# Patient Record
Sex: FEMALE | Race: WHITE | NOT HISPANIC OR LATINO | Employment: FULL TIME | URBAN - METROPOLITAN AREA
[De-identification: names, ages, dates, MRNs, and addresses within clinical notes are randomized per-mention and may not be internally consistent; named-entity substitution may affect disease eponyms.]

---
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
## 2021-04-24 ENCOUNTER — HEALTH MAINTENANCE LETTER (OUTPATIENT)
Age: 21
End: 2021-04-24

## 2021-05-27 ENCOUNTER — RECORDS - HEALTHEAST (OUTPATIENT)
Dept: ADMINISTRATIVE | Facility: CLINIC | Age: 21
End: 2021-05-27

## 2021-10-03 ENCOUNTER — HEALTH MAINTENANCE LETTER (OUTPATIENT)
Age: 21
End: 2021-10-03

## 2021-10-29 ENCOUNTER — MEDICAL CORRESPONDENCE (OUTPATIENT)
Dept: HEALTH INFORMATION MANAGEMENT | Facility: CLINIC | Age: 21
End: 2021-10-29

## 2021-11-11 ENCOUNTER — TRANSCRIBE ORDERS (OUTPATIENT)
Dept: OTHER | Age: 21
End: 2021-11-11

## 2021-11-11 DIAGNOSIS — F41.1 GENERALIZED ANXIETY DISORDER: ICD-10-CM

## 2021-11-11 DIAGNOSIS — F33.1 MAJOR DEPRESSIVE DISORDER, RECURRENT EPISODE, MODERATE (H): ICD-10-CM

## 2021-11-11 DIAGNOSIS — S06.0XAA CONCUSSION: ICD-10-CM

## 2021-11-29 ENCOUNTER — TELEPHONE (OUTPATIENT)
Dept: PHYSICAL MEDICINE AND REHAB | Facility: CLINIC | Age: 21
End: 2021-11-29

## 2021-11-29 NOTE — TELEPHONE ENCOUNTER
appointment arranged for concussion clinic per referral from [facility].Patient informed.

## 2022-05-15 ENCOUNTER — HEALTH MAINTENANCE LETTER (OUTPATIENT)
Age: 22
End: 2022-05-15

## 2022-09-10 ENCOUNTER — HEALTH MAINTENANCE LETTER (OUTPATIENT)
Age: 22
End: 2022-09-10

## 2022-10-11 PROCEDURE — 99283 EMERGENCY DEPT VISIT LOW MDM: CPT

## 2022-10-12 ENCOUNTER — HOSPITAL ENCOUNTER (EMERGENCY)
Facility: CLINIC | Age: 22
Discharge: HOME | End: 2022-10-12
Attending: EMERGENCY MEDICINE | Admitting: EMERGENCY MEDICINE
Payer: COMMERCIAL

## 2022-10-12 VITALS
HEIGHT: 69 IN | HEART RATE: 82 BPM | OXYGEN SATURATION: 99 % | SYSTOLIC BLOOD PRESSURE: 128 MMHG | DIASTOLIC BLOOD PRESSURE: 74 MMHG | TEMPERATURE: 98.9 F | RESPIRATION RATE: 16 BRPM

## 2022-10-12 DIAGNOSIS — S06.0X0A CONCUSSION WITHOUT LOSS OF CONSCIOUSNESS, INITIAL ENCOUNTER: ICD-10-CM

## 2022-10-12 DIAGNOSIS — F44.5 PSYCHOGENIC NONEPILEPTIC SEIZURE: ICD-10-CM

## 2022-10-12 DIAGNOSIS — F07.81 POST CONCUSSIVE SYNDROME: ICD-10-CM

## 2022-10-12 ASSESSMENT — ENCOUNTER SYMPTOMS
WEAKNESS: 1
BACK PAIN: 0
DIZZINESS: 1
NECK PAIN: 0
ABDOMINAL PAIN: 0
NUMBNESS: 1

## 2022-10-12 ASSESSMENT — ACTIVITIES OF DAILY LIVING (ADL): ADLS_ACUITY_SCORE: 35

## 2022-10-12 NOTE — ED PROVIDER NOTES
History   Chief Complaint:  Seizures     HPI   Katie Hurtado is a 22 year old female with history of concussions and anxiety induced seizures who presents with seizures. Five months ago, the patient was kicked in the head and chest by a horse and was diagnosed with a concussion following blood work and imaging.She has had two diagnosed concussions, and she believes she has had several undiagnosed concussions. Several days ago she was hit in the head. Since this event she has had an increased number of headaches and has felt low on her feet. Yesterday, she had several episodes of dry heaving and dizziness. Subsequently, she had a 30 minute episode of paralysis in which she did not lose consciousness. Additionally, she notes numbness and tingling in both of her arms. She denies any double or blurry vision, new neck or back pain or abdominal pain. Secondary to these symptoms she called the nursing line and was advised to present here. Of note, she does have a history of anxiety induced seizures and has seen neurology for this. She does see a therapist and psychiatrist. She occasionally uses alcohol but does not use tobacco or illegal drugs. She is not on any blood thinners.     Review of Systems   Eyes: Negative for visual disturbance.   Gastrointestinal: Negative for abdominal pain.   Musculoskeletal: Negative for back pain and neck pain.   Neurological: Positive for dizziness, weakness (bilateral upper extremity) and numbness.   All other systems reviewed and are negative.    Allergies:  Grass  Neomycin  Nickel  Lorazepam     Medications:  Vitamin D3  Prozac  Vitamin B    Past Medical History:    GERD  Factor V Leiden mutation  Seizure-like activity  Self-injurious behavior  Suicidal ideation  Borderline personality disorder  Autism spectrum disorder  Depression  Mononucleosis  Concussion  Hypermobility syndrome    Past Surgical History:    Arthroscopy knee with patellar realignment, left  Arthroscopy knee  "with retinacular release, left     Family History:    Brother: Alcoholism  Father: Anxiety, DVT, Depression  Mother: Factor V Leiden deficiency     Social History:  The patient was accompanied to the ED by her sister.  The patient occassionally uses alcohol.  The patient does not use tobacco or illegal substances.     Physical Exam     Patient Vitals for the past 24 hrs:   BP Temp Temp src Pulse Resp SpO2 Height   10/12/22 0253 128/74 -- -- 82 16 99 % --   10/11/22 2254 (!) 141/77 98.9  F (37.2  C) Temporal 85 16 97 % 1.753 m (5' 9\")       Physical Exam  Constitutional: Well developed, nontox appearance  Head: Atraumatic.   Neck:  no stridor, no meningismus  Eyes: no scleral icterus, PERRL, EOMI  Cardiovascular: RRR, 2+ bilat radial pulses  Pulmonary/Chest: nml resp effort  Ext: Warm, well perfused, no edema  Neurological: A&O,  CNII-XII intact, nml finger to nose, 5/5 strength throughout upper and lower ext, symmetric; sensation grossly intact  Skin: Skin is warm and dry.   Psychiatric: Behavior is normal. Thought content normal.   Nursing note and vitals reviewed.      Emergency Department Course   Emergency Department Course:     Reviewed:  I reviewed nursing notes, vitals, past medical history and care everywhere    Assessments:  0258 I obtained history and examined the patient as noted above. We discussed the plan for care.    Disposition:  The patient was discharged to home.     Impression & Plan   Medical Decision Makin year old female presenting w/ a head injury, episode of \"paralysis\"    This patient has a history and clinical exam consistent with concussion and likely postconcussive syndrome given her persistent/recurrent symptoms since her last concussion in .  Reported episode of paralysis seems to be functional in nature and likely consistent with her history of nonepileptiform psychogenic seizures.  Doubt skull fracture, epidural hematoma, subdural hematoma, intracerebral hemorrhage, and " traumatic subarachnoid hemorrhage; all of these are highly unlikely in this clinical setting. This patient denies severe headache, seizure, and has no focal neurological findings. The patient did not have prolonged LOC, repeated emesis, or poor orientation. By the [place] head CT rules, the patient does not appear to warrant head CT imaging at this time.  Concussion precautions given for home.  Concussion  referral placed prior to discharge.  At this time I feel the patient is safe for discharge.  Recommendations given regarding follow up with PCP and return to the emergency department as needed for new or worsening symptoms.  Counseled on all results, diagnosis and disposition.  Pt understanding and agreeable to plan. Patient discharged in stable condition.        Diagnosis:    ICD-10-CM    1. Concussion without loss of consciousness, initial encounter  S06.0X0A Concussion  Referral      2. Post concussive syndrome  F07.81 Concussion  Referral      3. Psychogenic nonepileptic seizure  F44.5 Concussion  Referral        Scribe Disclosure:  I, Yoandy Palumbo, am serving as a scribe at 1:56 AM on 10/12/2022 to document services personally performed by Joseluis Fry MD based on my observations and the provider's statements to me.      Joseluis Fry MD  10/12/22 0354

## 2022-10-12 NOTE — ED TRIAGE NOTES
"Patient stated that she had a 30 minute \"paralysis episode, did not lose consciousness.  Recent concussion.      Triage Assessment     Row Name 10/11/22 2729       Triage Assessment (Adult)    Airway WDL WDL       Respiratory WDL    Respiratory WDL WDL       Skin Circulation/Temperature WDL    Skin Circulation/Temperature WDL WDL       Cardiac WDL    Cardiac WDL WDL       Peripheral/Neurovascular WDL    Peripheral Neurovascular WDL WDL       Cognitive/Neuro/Behavioral WDL    Cognitive/Neuro/Behavioral WDL WDL              "

## 2022-10-12 NOTE — DISCHARGE INSTRUCTIONS
1.  -Take acetaminophen 500 to 1000 mg by mouth every 4 to 6 hours as needed for pain or fever.  Do not take more than 4000 mg in 24 hours.  Do not take within 6 hours of another acetaminophen containing medication such as norco (vicodin) or percocet.  - Take ibuprofen 600 to 800 mg by mouth every 6 to 8 hours as needed for pain or fever  2. Stay hydrated at home.  3. Please stay in low stimulus, dim environments for the next 48 hours.  4. You may return to exercising or strenuous physical activity 7 days after your symptoms resolve.  5. Please cease activity that worsens your symptoms.  6. Please follow-up with your primary care doctor as needed.  If symptoms persist for weeks, you may discuss referral to a neurologist with your primary doctor.  7. You may return to the ED as needed for new or worsening symptoms such as vomiting and unable to keep anything down, severe confusion and inability to function at home, severe and uncontrollable pain, focal weakness.      Discharge Instructions  Concussion    You were seen today for signs of a concussion.  The symptoms will vary, depending on the nature of your injury and your health. You may have: headache, confusion, nausea (feel sick to your stomach), vomiting (throwing up) and problems with memory, concentrating, or sleep. You may feel dizzy, irritable, and tired. Children and teens may need help from their parents, teachers, and coaches to watch for symptoms as they recover.    Generally, every Emergency Department visit should have a follow-up clinic visit with either a primary or a specialty clinic/provider. Please follow-up as instructed by your emergency provider today.     Return to the Emergency Department if:  Your headache gets worse or you start to have a really bad headache even with the recommended treatment plan.   You feel drowsier, have growing confusion, or slurred speech.   You keep repeating yourself.   You have strange behavior or are feeling more  irritable.   You have a seizure.   You vomit (throw up) more than once.   You have trouble walking.   You have weakness or numbness.  Your neck pain gets worse.   You have a loss of consciousness.   You have blood for fluid coming from your ears or nose.   You have new symptoms or anything that worries you.     Home Care:  Get lots of rest and get enough sleep at night. Take daytime naps or rest if you feel tired.   Limit physical activity and  thinking  activities. These can make symptoms worse.   Physical activities include gym, sports, weight training, running, exercise, and heavy lifting.   Thinking activities include homework, class work, job-related work, and screen time (phone, computer, tablet, TV, and video games).   Stick to a healthy diet and drink lots of fluids. Avoid alcohol.  As symptoms improve, you may slowly return to your daily activities. If symptoms get worse or return, reduce your activity.   Know that it is normal to feel sad or frustrated when you do not feel right and are less active.     Going Back to Work:  Your care team will tell you when you are ready to return to work.    Limit the amount of work you do soon after your injury. This may speed healing. Take breaks if your symptoms get worse. You should also reduce your physical activity as well as activities that require a lot of thinking until you see your doctor. You may need shorter work days and a lighter workload.  Avoid heavy lifting, working with machinery, driving and working at heights until your symptoms are gone or you are cleared by a provider.    Going Back to School:  If you are still having symptoms, you may need extra help at school.  Tell your teachers and school nurse about your injury and symptoms. Ask them to watch for problems with learning, memory, and concentrating. Symptoms may get worse when you do schoolwork, and you may become more irritable. You may need shorter school days, a reduced workload, and to postpone  testing.  Do not drive or take gym class (physical activity) until cleared by a provider.    Returning to Sports:  Never return to play if you have any symptoms. A full recovery will reduce the chances of getting hurt again. Remember, it is better to miss one or two games than a whole season.  You should rest from all physical activity until you see your provider. Generally, if all symptoms have completely cleared, your provider can help guide you to slowly return to sports. If symptoms return or worsen, stop the activity and see your provider.  Important: If you are in an organized sport and under age 18, you will need written consent from a healthcare provider before you return to sports. Typically, this will be your primary care or sports medicine provider. Please make an appointment.    If you were given a prescription for medicine here today, be sure to read all of the information (including the package insert) that comes with your prescription.  This will include important information about the medicine, its side effects, and any warnings that you need to know about.  The pharmacist who fills the prescription can provide more information and answer questions you may have about the medicine.  If you have questions or concerns that the pharmacist cannot address, please call or return to the Emergency Department.     Remember that you can always come back to the Emergency Department if you are not able to see your regular provider in the amount of time listed above, if you get any new symptoms, or if there is anything that worries you.

## 2022-10-13 ENCOUNTER — NURSE TRIAGE (OUTPATIENT)
Dept: NURSING | Facility: CLINIC | Age: 22
End: 2022-10-13

## 2022-10-13 NOTE — TELEPHONE ENCOUNTER
"Patient is calling about worsening headache and new neurologic symptoms since being evaluated yesterday in ED for concussion.    She was in ED yesterday - discharged at 0330  Diagnosed with concussion  She states her headache is not getting better; she rates pain 7-8/10, pain varies in intensity but she feels it is overall worse than yesterday. She has been taking Tyleno and ibuprofen as ordered. She also has numbness/cold tingling feeling in limbs ; she had this symptom in her arms yesterday, but today she has new numbness/tingling in the legs. She is also concerned because the ED recommended she take 2 days off of work, but she has a strenuous physical job at a farm and not sure if she can safely return in 2 days.    Reviewed AVS with patient, including:    \"3. Please stay in low stimulus, dim environments for the next 48 hours.  4. You may return to exercising or strenuous physical activity 7 days  after your symptoms resolve.  ...  You may return to the ED as needed for new or worsening symptoms such as vomiting and unable to keep anything down, severe confusion and inability to function at home, severe and uncontrollable pain, focal Weakness.  ...  Your headache gets worse or you start to have a really bad headache even with the recommended treatment plan.  ...  You have weakness or numbness.  ...  You have new symptoms or anything that worries you.\"    Writer recommended patient return to ED for worsening headache and new numbness in legs. She states her boyfriend will be there shortly to bring her to [facility] in [place]. Writer recommended patient go to ED, as her [facility] might not see head injury or follow up care; she plans to go to this clinic first to check.    Ofelia Ward RN  [place] Nurse Advisor  8:48 AM  10/13/2022      Reason for Disposition    SEVERE headache (e.g., excruciating, pain scale 8-10) and not improved after pain medications    Additional Information    Negative: Weakness (i.e., " paralysis, loss of muscle strength) of the face, arm or leg on one side of the body    Negative: Loss of speech or garbled speech    Negative: Difficult to awaken or acting confused (e.g., disoriented, slurred speech)    Negative: Sounds like a life-threatening emergency to the triager    Negative: Concussion suspected and has not been examined by a doctor (or NP/PA)    Negative: Mild traumatic brain injury (mTBI; concussion) and more than 14 days since head injury    Negative: Concussion symptoms are worsening    Negative: Knocked out (unconscious) > 1 minute and no head CT Scan or MRI has been performed    Negative: Vomiting once or more and no head CT Scan or MRI has been performed    Negative: Unsteady on feet (e.g., unable to stand or requires support to walk) and no head CT Scan or MRI has been performed    Negative: Neck pain after dangerous injury (e.g., MVA, diving, trampoline, contact sports, fall > 10 feet or 3 meters) and no neck xray has been performed (e.g., c-spine xray or CT)    Negative: Patient sounds very sick or weak to the triager    Protocols used: CONCUSSION (MTBI) LESS THAN 14 DAYS AGO FOLLOW-UP CALL-A-[place]

## 2022-10-19 ENCOUNTER — VIRTUAL VISIT (OUTPATIENT)
Dept: NEUROLOGY | Facility: CLINIC | Age: 22
End: 2022-10-19
Payer: COMMERCIAL

## 2022-10-19 DIAGNOSIS — F07.81 POST CONCUSSIVE SYNDROME: Primary | ICD-10-CM

## 2022-10-19 DIAGNOSIS — F44.5 PSYCHOGENIC NONEPILEPTIC SEIZURE: ICD-10-CM

## 2022-10-19 DIAGNOSIS — S06.0X0A CONCUSSION WITHOUT LOSS OF CONSCIOUSNESS, INITIAL ENCOUNTER: ICD-10-CM

## 2022-10-19 PROCEDURE — 99205 OFFICE O/P NEW HI 60 MIN: CPT | Mod: GT | Performed by: NURSE PRACTITIONER

## 2022-10-19 RX ORDER — MIRTAZAPINE 15 MG/1
15 TABLET, FILM COATED ORAL AT BEDTIME
COMMUNITY
Start: 2022-01-31

## 2022-10-19 RX ORDER — SERTRALINE HYDROCHLORIDE 100 MG/1
100 TABLET, FILM COATED ORAL DAILY
COMMUNITY
Start: 2022-10-03

## 2022-10-19 RX ORDER — HYDROXYZINE PAMOATE 25 MG/1
CAPSULE ORAL
COMMUNITY
Start: 2022-10-02

## 2022-10-19 NOTE — PROGRESS NOTES
CONCUSSION SYMPTOMS ASSESSMENT 10/19/2022   Headache or Pressure In Head 4 - moderate to severe   Upset Stomach or Throwing Up 2 - mild to moderate   Problems with Balance 5 - severe   Feeling Dizzy 4 - moderate to severe   Sensitivity to Light 0 - none   Sensitivity to Noise 1 - mild   Mood Changes 1 - mild   Feeling sluggish, hazy, or foggy 5 - severe   Trouble Concentrating, Lack of Focus 1 - mild   Motion Sickness 0 - none   Vision Changes 0 - none   Memory Problems 2 - mild to moderate   Feeling Confused 1 - mild   Neck Pain 3 - moderate   Trouble Sleeping 1 - mild   Total Number of Symptoms 12   Symptom Severity Score 30
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
## 2022-12-28 ENCOUNTER — VIRTUAL VISIT (OUTPATIENT)
Dept: NEUROLOGY | Facility: CLINIC | Age: 22
End: 2022-12-28
Payer: COMMERCIAL

## 2022-12-28 DIAGNOSIS — F07.81 POST CONCUSSION SYNDROME: Primary | ICD-10-CM

## 2022-12-28 PROCEDURE — 99213 OFFICE O/P EST LOW 20 MIN: CPT | Mod: GT | Performed by: NURSE PRACTITIONER

## 2022-12-28 NOTE — PROGRESS NOTES
"Visit start time-0816  Video Visit: Concussion Follow up:   Katie Hurtado is a 22 year old female who is being evaluated via a billable video visit       The patient has been notified of the following:     \"This video visit will be conducted via a video call between you and your provider. We have found that certain health care needs can be provided without the need for a physical exam.  This service lets us provide the care you need with a short video conversation.  If a prescription is necessary we can send it directly to your pharmacy.  If lab work is needed we can place an order for that and you can then stop by our lab to have the test done at a later time.    If during the course of the call the provider feels a video visit is not appropriate, you will not be charged for this service.\"     Patient has given verbal consent to a video visit? Yes    Visit Check In:   Orders from previous visit:   Order PT for dizziness, numbness, neck and head pain, OT for sound sensitivity and anxiety reducing techniques, ST for cognition, no working.  Neuropsychological assessment completed    No   Currently doing PT  No    Completed Yes   Currently doing OT  No    Completed Yes   Currently doing ST   No    Completed No   Psychology  Yes   Return to Work/School - patient is currently looking for a job    Currently on medication to help you sleep   Yes  Mirtazapine and Hydroxyzine  Currently on medication to help with mental health Yes     Hydroxyzine and Sertraline  for   MMD. ROLAN, Autism  Currently on medication for concentration or ADD /ADHD      No                                                        Workman's Comp   No     Outpatient Follow up Mild TBI (Concussion)  Evaluation:   Katie Hurtado chief complaint is Post Concussion Syndrome     Is patient on a controlled substance prescribed by me?  No     HPI:      Pertinent History:   Per ED note on 10/12/22...Katie Hurtado is a 22 year old female with history " of concussions and anxiety induced seizures who presents with seizures. Five months ago, the patient was kicked in the head and chest by a horse and was diagnosed with a concussion following blood work and imaging.She has had two diagnosed concussions, and she believes she has had several undiagnosed concussions. Several days ago she was hit in the head. Since this event she has had an increased number of headaches and has felt low on her feet. Yesterday, she had several episodes of dry heaving and dizziness. Subsequently, she had a 30 minute episode of paralysis in which she did not lose consciousness. Additionally, she notes numbness and tingling in both of her arms. She denies any double or blurry vision, new neck or back pain or abdominal pain. Secondary to these symptoms she called the nursing line and was advised to present here. Of note, she does have a history of anxiety induced seizures and has seen neurology for this. She does see a therapist and psychiatrist. She occasionally uses alcohol but does not use tobacco or illegal drugs. She is not on any blood thinners.     Date of accident :  10/11/22    Plan:        We discussed some treatment options and have elected to  Referral to PT for cranial sacral therapy to help with neck pain, patient will monitor symptoms once she starts a new job     Medication Adjustment:  No medication changes    Return to Work/School -patient is not working at this time    Return to clinic 3 months    Continue with the support of the clinic, reassurance, and redirection. Staff monitoring and ongoing assessments per team plan. This team will utilize appropriate emergency services if necessary. I will make myself available if concerns or problems arise.  The patient agrees to call/message before her next visit with any questions, concerns or problems.    Progress Note:        The patient returns to the concussion clinic for a follow up visit, She was last seen by me on 10/19/22,  no medication changes were made at that appointment. Patient reports that she is doing much better. She did not receive a call to set up therapies but reports not needing much any more. She reports that her headaches have reduced in severity and frequency, she states that she is having about 1-2 headaches  She is experiencing neck pain. She is currently looking for employment. Overall the patient is reporting improvement in her physical, emotional and cognitive symptoms     Subjective:        Overall improvement from last visit   Yes     Headaches:  Significant ongoing headaches Yes   Headaches: Intermittently  Improvement :Yes   Current Headache No   Wake with HA  Yes     Physical Symptoms:  Headache-Yes     Since last visit  Improved     Nausea-No               Balance problems - No       Dizziness - No           Visual problems - No       Fatigue - Yes              Since last visit  Improved     Sensitivity to light - No         Sensitivity to sound - No         Numbness/tingling - No            Cognitive Symptoms  Feeling mentally foggy -Yes        Since last visit  Improved     Feeling confused -No          Difficulty Concentrating- No         Difficulty remembering - No              Emotional Symptoms  Irritability - No            Sadness-  No          More emotional - No           Nervousness/anxiety -No            Sleep History:  Sleep less than usual - No    Sleep more than usual - No    Trouble falling asleep - No        Trouble staying asleep - Yes       Since last visit  Improved     Wake feeling rested - most of the time        Since last visit  Improved        Exertion:         Do the above stated symptoms worsen with physical activity? No                   Do the above stated symptoms worsen with cognitive activity? No                Objective:   There are no problems to display for this patient.    Past Medical History:   Diagnosis Date     Heartburn      Past Surgical History:   Procedure Laterality  Date     ARTHROSCOPY KNEE WITH PATELLAR REALIGNMENT Left 12/18/2015    Procedure: ARTHROSCOPY KNEE WITH PATELLAR REALIGNMENT;  Surgeon: Jennifer Funez MD;  [facility]     ARTHROSCOPY KNEE WITH RETINACULAR RELEASE Left 12/18/2015    Procedure: ARTHROSCOPY KNEE WITH RETINACULAR RELEASE MEDIAL OR LATERAL;  Surgeon: Jennifer Funez MD;  Location: [facility]     No family history on file.  Current Outpatient Medications   Medication Sig Dispense Refill     acetaminophen (TYLENOL) 325 MG tablet Take 2 tablets (650 mg) by mouth every 4 hours as needed for other (mild pain) 100 tablet 0     Calcium Carbonate Antacid (TUMS PO) Take 1 tablet by mouth as needed       cetirizine (ZYRTEC) 10 MG tablet Take 10 mg by mouth as needed        Cholecalciferol (VITAMIN D3 PO) Take 400 Units by mouth daily       Elastic Bandages & Supports (KNEE BRACE) [facility] Patient has Left patellar instability.    Dr. Funez has ordered a Don Judy Reaction brace or Q payton Brace. 1 each 0     hydrOXYzine (VISTARIL) 25 MG capsule TAKE ONE TO TWO CAPSULES BY MOUTH AT BEDTIME AS NEEDED       Ibuprofen (ADVIL PO) Take 1-2 tablets by mouth as needed for moderate pain       mirtazapine (REMERON) 15 MG tablet Take 15 mg by mouth At Bedtime       sertraline (ZOLOFT) 100 MG tablet Take 100 mg by mouth daily       sertraline (ZOLOFT) 50 MG tablet TAKE ONE TABLET BY MOUTH EVERY MORNING WITH 100 MG TABLET FOR TOTAL DOSE  MG       vitamin B complex with vitamin C (STRESS TAB) tablet Take 1 tablet by mouth daily       Social History     Socioeconomic History     Marital status: Single     Spouse name: Not on file     Number of children: Not on file     Years of education: Not on file     Highest education level: Not on file   Occupational History     Not on file   Tobacco Use     Smoking status: Never     Smokeless tobacco: Never   Substance and Sexual Activity     Alcohol use: No     Drug use: No     Sexual activity: Not on file   Other Topics Concern      Not on file   Social History Narrative     Not on file     Social Determinants of Health     Financial Resource Strain: Not on file   Food Insecurity: Not on file   Transportation Needs: Not on file   Physical Activity: Not on file   Stress: Not on file   Social Connections: Not on file   Intimate Partner Violence: Not on file   Housing Stability: Not on file       ALLERGIES  Grass, Neomycin, and Nickel    The following portions of the patient's history were reviewed and updated as appropriate: allergies, current medications, past family history, past medical history, past social history, past surgical history and problem list.    Review of Systems  A comprehensive review of systems was negative except for: What is noted above    Mental Status Examination  Alertness:  alert  and oriented  Appearance:  casually groomed  Behavior/Demeanor:  cooperative, pleasant and calm, with good  eye contact.  Speech:  normal  Psychomotor:  normal or unremarkable    Mood:  good  Affect:  appropriate and was congruent to speech content.  Thought Process/Associations: unremarkable   Thought Content: devoid of  suicidal and violent ideation, delusions [details in Interim History] and delusions.   Perception: devoid of  auditory hallucinations and visual hallucinations  Insight:  good.  Judgment: good.  Attention/Concentration:  Normal  Language:  Intact  Fund of Knowledge:  Average.    Memory:  Immediate recall intact, Short-term memory intact and Long-term memory intact.       Counseling:   Discussion was held with the patient today regarding concussion in general including types of injury, symptoms that are common, treatment and variability in time to recover  I have reassured the patient her symptoms are very common when a concussion is present and will improve with time. We discussed the risks and benefits of possible medication used to help concussive symptoms including risk of worsening depression with medication adjustments  and even the possibility of emergence of suicidal ideations. We will assess for the appropriateness of possible psychotropic medication trials/changes. The patient will seek out appropriate emergency services should that become necessary. The patient agrees to call/message before her next visit with any questions, concerns or problems.    Visit Details:   Type of service: Video Visit    Video Start Time: 0816    Video End Time:  0829    Originating Location: Patient's home    Distant Location:  [facility]    Mode of Communication: Video Conference via  [employer] (My Chart)     Diagnosis managed and treated at today's visit :  Post concussion syndrome  Post concussion headache  Nausea  Dizziness  Fatigue  Insomnia  Sensitivity to light  Sound sensitivity  Concentration and Attention deficit  Memory difficulties  Anxiety d/t a medical condition  Irritability  Return to work    Total time today (20 min) in this patient encounter was spent on pre-charting, chart review, review of outside records, review of test results, interpretation of tests, patient visit and documentation and counseling and/or coordination of care. The patient is in agreement with this plan and has no further questions.    General Information:   Today you had your appointment with Mesha Ellis CNP     If lab work was done today as part of your evaluation you will generally be contacted via My Chart, mail, or phone with the results within 1-5 days. If there is an alarming result we will contact you by phone. Lab results come back at varying times, I generally wait until all labs are resulted before making comments on results. Please note labs are automatically released to My Chart once available.     If you need refills please contact your pharmacist. They will send a refill request to me to review. If it is a controlled substance please message me through [employer]. Please allow 3 business days for us to  process all refill requests.     Please call or send a medical message through My Chart, with any questions or concerns    If you need any paperwork completed please fax forms to [phone]. Please state if you would like a copy of the completed paperwork, mailed or faxed back to the patient and a fax number to fax the paperwork to. Please allow up to 10 business days for paperwork to be completed.    TAMRA Prince, CNP      [facility]  [facility] [address]  [address] [place] [id number]  Office: [phone]  Fax: [phone]

## 2022-12-28 NOTE — LETTER
"    12/28/2022         RE: Katie Hurtado  [address] [address]  [facility] [address]        Dear Colleague,    Thank you for referring your patient, Katie Hurtado, to the [facility]. Please see a copy of my visit note below.    Visit start time-0816  Video Visit: Concussion Follow up:   Katie Hurtado is a 22 year old female who is being evaluated via a billable video visit       The patient has been notified of the following:     \"This video visit will be conducted via a video call between you and your provider. We have found that certain health care needs can be provided without the need for a physical exam.  This service lets us provide the care you need with a short video conversation.  If a prescription is necessary we can send it directly to your pharmacy.  If lab work is needed we can place an order for that and you can then stop by our lab to have the test done at a later time.    If during the course of the call the provider feels a video visit is not appropriate, you will not be charged for this service.\"     Patient has given verbal consent to a video visit? Yes    Visit Check In:   Orders from previous visit:   Order PT for dizziness, numbness, neck and head pain, OT for sound sensitivity and anxiety reducing techniques, ST for cognition, no working.  Neuropsychological assessment completed    No   Currently doing PT  No    Completed Yes   Currently doing OT  No    Completed Yes   Currently doing ST   No    Completed No   Psychology  Yes   Return to Work/School - patient is currently looking for a job    Currently on medication to help you sleep   Yes  Mirtazapine and Hydroxyzine  Currently on medication to help with mental health Yes     Hydroxyzine and Sertraline  for   MMD. ROLAN, Autism  Currently on medication for concentration or ADD /ADHD      No                                                        Workman's Comp   No     Outpatient Follow up Mild TBI " (Concussion)  Evaluation:   Katie Hurtado chief complaint is Post Concussion Syndrome     Is patient on a controlled substance prescribed by me?  No     HPI:      Pertinent History:   Per ED note on 10/12/22...Katie Hurtado is a 22 year old female with history of concussions and anxiety induced seizures who presents with seizures. Five months ago, the patient was kicked in the head and chest by a horse and was diagnosed with a concussion following blood work and imaging.She has had two diagnosed concussions, and she believes she has had several undiagnosed concussions. Several days ago she was hit in the head. Since this event she has had an increased number of headaches and has felt low on her feet. Yesterday, she had several episodes of dry heaving and dizziness. Subsequently, she had a 30 minute episode of paralysis in which she did not lose consciousness. Additionally, she notes numbness and tingling in both of her arms. She denies any double or blurry vision, new neck or back pain or abdominal pain. Secondary to these symptoms she called the nursing line and was advised to present here. Of note, she does have a history of anxiety induced seizures and has seen neurology for this. She does see a therapist and psychiatrist. She occasionally uses alcohol but does not use tobacco or illegal drugs. She is not on any blood thinners.     Date of accident :  10/11/22    Plan:        We discussed some treatment options and have elected to  Referral to PT for cranial sacral therapy to help with neck pain, patient will monitor symptoms once she starts a new job     Medication Adjustment:  No medication changes    Return to Work/School -patient is not working at this time    Return to clinic 3 months    Continue with the support of the clinic, reassurance, and redirection. Staff monitoring and ongoing assessments per team plan. This team will utilize appropriate emergency services if necessary. I will make myself  available if concerns or problems arise.  The patient agrees to call/message before her next visit with any questions, concerns or problems.    Progress Note:        The patient returns to the concussion clinic for a follow up visit, She was last seen by me on 10/19/22, no medication changes were made at that appointment. Patient reports that she is doing much better. She did not receive a call to set up therapies but reports not needing much any more. She reports that her headaches have reduced in severity and frequency, she states that she is having about 1-2 headaches  She is experiencing neck pain. She is currently looking for employment. Overall the patient is reporting improvement in her physical, emotional and cognitive symptoms     Subjective:        Overall improvement from last visit   Yes     Headaches:  Significant ongoing headaches Yes   Headaches: Intermittently  Improvement :Yes   Current Headache No   Wake with HA  Yes     Physical Symptoms:  Headache-Yes     Since last visit  Improved     Nausea-No               Balance problems - No       Dizziness - No           Visual problems - No       Fatigue - Yes              Since last visit  Improved     Sensitivity to light - No         Sensitivity to sound - No         Numbness/tingling - No            Cognitive Symptoms  Feeling mentally foggy -Yes        Since last visit  Improved     Feeling confused -No          Difficulty Concentrating- No         Difficulty remembering - No              Emotional Symptoms  Irritability - No            Sadness-  No          More emotional - No           Nervousness/anxiety -No            Sleep History:  Sleep less than usual - No    Sleep more than usual - No    Trouble falling asleep - No        Trouble staying asleep - Yes       Since last visit  Improved     Wake feeling rested - most of the time        Since last visit  Improved        Exertion:         Do the above stated symptoms worsen with physical activity?  No                   Do the above stated symptoms worsen with cognitive activity? No                Objective:   There are no problems to display for this patient.    Past Medical History:   Diagnosis Date     Heartburn      Past Surgical History:   Procedure Laterality Date     ARTHROSCOPY KNEE WITH PATELLAR REALIGNMENT Left 12/18/2015    Procedure: ARTHROSCOPY KNEE WITH PATELLAR REALIGNMENT;  Surgeon: Jennifer Funez MD;  [facility]     ARTHROSCOPY KNEE WITH RETINACULAR RELEASE Left 12/18/2015    Procedure: ARTHROSCOPY KNEE WITH RETINACULAR RELEASE MEDIAL OR LATERAL;  Surgeon: Jennifer Funez MD;  Location: [facility]     No family history on file.  Current Outpatient Medications   Medication Sig Dispense Refill     acetaminophen (TYLENOL) 325 MG tablet Take 2 tablets (650 mg) by mouth every 4 hours as needed for other (mild pain) 100 tablet 0     Calcium Carbonate Antacid (TUMS PO) Take 1 tablet by mouth as needed       cetirizine (ZYRTEC) 10 MG tablet Take 10 mg by mouth as needed        Cholecalciferol (VITAMIN D3 PO) Take 400 Units by mouth daily       Elastic Bandages & Supports (KNEE BRACE) [facility] Patient has Left patellar instability.    Dr. Funez has ordered a Carlos Goody Reaction brace or Q payton Brace. 1 each 0     hydrOXYzine (VISTARIL) 25 MG capsule TAKE ONE TO TWO CAPSULES BY MOUTH AT BEDTIME AS NEEDED       Ibuprofen (ADVIL PO) Take 1-2 tablets by mouth as needed for moderate pain       mirtazapine (REMERON) 15 MG tablet Take 15 mg by mouth At Bedtime       sertraline (ZOLOFT) 100 MG tablet Take 100 mg by mouth daily       sertraline (ZOLOFT) 50 MG tablet TAKE ONE TABLET BY MOUTH EVERY MORNING WITH 100 MG TABLET FOR TOTAL DOSE  MG       vitamin B complex with vitamin C (STRESS TAB) tablet Take 1 tablet by mouth daily       Social History     Socioeconomic History     Marital status: Single     Spouse name: Not on file     Number of children: Not on file     Years of education: Not on file      Highest education level: Not on file   Occupational History     Not on file   Tobacco Use     Smoking status: Never     Smokeless tobacco: Never   Substance and Sexual Activity     Alcohol use: No     Drug use: No     Sexual activity: Not on file   Other Topics Concern     Not on file   Social History Narrative     Not on file     Social Determinants of Health     Financial Resource Strain: Not on file   Food Insecurity: Not on file   Transportation Needs: Not on file   Physical Activity: Not on file   Stress: Not on file   Social Connections: Not on file   Intimate Partner Violence: Not on file   Housing Stability: Not on file       ALLERGIES  Grass, Neomycin, and Nickel    The following portions of the patient's history were reviewed and updated as appropriate: allergies, current medications, past family history, past medical history, past social history, past surgical history and problem list.    Review of Systems  A comprehensive review of systems was negative except for: What is noted above    Mental Status Examination  Alertness:  alert  and oriented  Appearance:  casually groomed  Behavior/Demeanor:  cooperative, pleasant and calm, with good  eye contact.  Speech:  normal  Psychomotor:  normal or unremarkable    Mood:  good  Affect:  appropriate and was congruent to speech content.  Thought Process/Associations: unremarkable   Thought Content: devoid of  suicidal and violent ideation, delusions [details in Interim History] and delusions.   Perception: devoid of  auditory hallucinations and visual hallucinations  Insight:  good.  Judgment: good.  Attention/Concentration:  Normal  Language:  Intact  Fund of Knowledge:  Average.    Memory:  Immediate recall intact, Short-term memory intact and Long-term memory intact.       Counseling:   Discussion was held with the patient today regarding concussion in general including types of injury, symptoms that are common, treatment and variability in time to recover   I have reassured the patient her symptoms are very common when a concussion is present and will improve with time. We discussed the risks and benefits of possible medication used to help concussive symptoms including risk of worsening depression with medication adjustments and even the possibility of emergence of suicidal ideations. We will assess for the appropriateness of possible psychotropic medication trials/changes. The patient will seek out appropriate emergency services should that become necessary. The patient agrees to call/message before her next visit with any questions, concerns or problems.    Visit Details:   Type of service: Video Visit    Video Start Time: 0816    Video End Time:  0829    Originating Location: Patient's home    Distant Location:  [facility]  [place]    Mode of Communication: Video Conference via  [employer] (My Chart)     Diagnosis managed and treated at today's visit :  Post concussion syndrome  Post concussion headache  Nausea  Dizziness  Fatigue  Insomnia  Sensitivity to light  Sound sensitivity  Concentration and Attention deficit  Memory difficulties  Anxiety d/t a medical condition  Irritability  Return to work    Total time today (20 min) in this patient encounter was spent on pre-charting, chart review, review of outside records, review of test results, interpretation of tests, patient visit and documentation and counseling and/or coordination of care. The patient is in agreement with this plan and has no further questions.    General Information:   Today you had your appointment with Mesha Ellis CNP     If lab work was done today as part of your evaluation you will generally be contacted via My Chart, mail, or phone with the results within 1-5 days. If there is an alarming result we will contact you by phone. Lab results come back at varying times, I generally wait until all labs are resulted before making comments on results. Please note  labs are automatically released to My Chart once available.     If you need refills please contact your pharmacist. They will send a refill request to me to review. If it is a controlled substance please message me through [employer]. Please allow 3 business days for us to process all refill requests.     Please call or send a medical message through My Chart, with any questions or concerns    If you need any paperwork completed please fax forms to [phone]. Please state if you would like a copy of the completed paperwork, mailed or faxed back to the patient and a fax number to fax the paperwork to. Please allow up to 10 business days for paperwork to be completed.    TAMRA Prince, CNP      [facility]  [facility] [address]  [address] [place] [address]  Office: [phone]  Fax: [phone]          Again, thank you for allowing me to participate in the care of your patient.        Sincerely,        TAMRA Prince CNP

## 2022-12-28 NOTE — NURSING NOTE
CONCUSSION SYMPTOMS ASSESSMENT 10/19/2022 12/28/2022   Headache or Pressure In Head 4 - moderate to severe 2 - mild to moderate   Upset Stomach or Throwing Up 2 - mild to moderate 0 - none   Problems with Balance 5 - severe 0 - none   Feeling Dizzy 4 - moderate to severe 0 - none   Sensitivity to Light 0 - none 0 - none   Sensitivity to Noise 1 - mild 0 - none   Mood Changes 1 - mild 0 - none   Feeling sluggish, hazy, or foggy 5 - severe 1 - mild   Trouble Concentrating, Lack of Focus 1 - mild 0 - none   Motion Sickness 0 - none 0 - none   Vision Changes 0 - none 0 - none   Memory Problems 2 - mild to moderate 0 - none   Feeling Confused 1 - mild 0 - none   Neck Pain 3 - moderate 3 - moderate   Trouble Sleeping 1 - mild 1 - mild   Total Number of Symptoms 12 4   Symptom Severity Score 30 7

## 2023-01-03 ENCOUNTER — HOSPITAL ENCOUNTER (OUTPATIENT)
Dept: PHYSICAL THERAPY | Facility: REHABILITATION | Age: 23
Discharge: HOME | End: 2023-01-03
Attending: NURSE PRACTITIONER
Payer: COMMERCIAL

## 2023-01-03 DIAGNOSIS — F07.81 POST CONCUSSION SYNDROME: Primary | ICD-10-CM

## 2023-01-03 DIAGNOSIS — G44.219 EPISODIC TENSION-TYPE HEADACHE, NOT INTRACTABLE: ICD-10-CM

## 2023-01-03 DIAGNOSIS — M79.18 MYOFASCIAL PAIN: ICD-10-CM

## 2023-01-03 PROCEDURE — 97161 PT EVAL LOW COMPLEX 20 MIN: CPT | Mod: GP | Performed by: PHYSICAL THERAPIST

## 2023-01-03 PROCEDURE — 97140 MANUAL THERAPY 1/> REGIONS: CPT | Mod: GP | Performed by: PHYSICAL THERAPIST

## 2023-01-03 NOTE — PROGRESS NOTES
01/03/23 0800   General Information   Type of Visit Initial OP Ortho PT Evaluation   Start of Care Date 01/03/23   Referring Physician Mesha Ellis CNP   Date of Order 12/28/22   Certification Required? No   Medical Diagnosis post concussive   Body Part(s)   Body Part(s) Cervical Spine   Presentation and Etiology   Pertinent history of current problem (include personal factors and/or comorbidities that impact the POC) Pt presents to PT with history of concussions. She had her third known concussion on 10/11/22. This one was the worst as far as symptoms even though this was the least of her injuries. She was hit up along side the head with an open hand and this is what set it off. She was trampled by a horse for a concussion in June and was hit by a horse in the head in 2021. She is thinking that there were other ones as well in the past. The latest concussion did cause her to have a paralysis moment which lasted about 30 min. She does get regular headaches and neck pain. She does see a massage therapist for her neck. She usually does get HA's daily. They are typically over the eyes or at the base of the skull. She does get some N/T into her arms but that is only when she is stressed out. It is hard for her to fall asleep/get comfortable. It does pull on the R side of her neck when looking down and slight pulls when rotating the neck. She does get rare dizziness and usually is accompanied by stress. Denies double vision. She does not take meds for her headaches. SHe does state that she is also hypermobile in her joints.   Onset date of current episode/exacerbation 10/11/22   Chronicity Chronic   Pain rating (0-10 point scale) Best (/10);Worst (/10);Other   Best (/10) 0   Worst (/10) 8   Pain rating comment today: 4/10   Pain quality B. Dull;C. Aching;H. Other   Pain quality comment pressure   Frequency of pain/symptoms B. Intermittent   Progression of symptoms since onset: Improved   Fall Risk Screen   Fall  screen completed by PT   Have you fallen 2 or more times in the past year? Yes   Have you fallen and had an injury in the past year? Yes   Is patient a fall risk? Yes   Abuse Screen (yes response referral indicated)   Feels Unsafe at Home or Work/School no   Feels Threatened by Someone no   Does Anyone Try to Keep You From Having Contact with Others or Doing Things Outside Your Home? no   Physical Signs of Abuse Present no   Patient needs abuse support services and resources No   System Outcome Measures   Outcome Measures   (Concussion scale: 19)   Cervical Spine   Cervical Right Rotation ROM 80   Cervical Left Rotation ROM 80   Cervical Flexion ROM WFL with pull on R side   Cervical Extension ROM 70 deg   Thoracic Right Rotation 70   Thoracic Left Rotation 70   Shoulder AROM Screen WFL   Shoulder Shrug (C2-C4) Strength shoulder flexion: 4+ B   Shoulder Abd (C5) Strength 4+ B   Elbow Flexion (C5, C6) Strength 4+ B   Elbow Extension (C7) Strength 4+ B   Palpation increased tone in CT parapsinals and into upper trap/levator scap/scalenes. fascial hypomobiitlies present in cranium, spine, pelvis/sacrum.   Posture rounded shoulders, flattened T spine, Dowager's hump, FHP, increased lumbar lordosis.   Planned Therapy Interventions   Planned Therapy Interventions ROM;strengthening;stretching;neuromuscular re-education;manual therapy   Clinical Impression   Criteria for Skilled Therapeutic Interventions Met yes, treatment indicated   PT Diagnosis post concussive symptoms, HA's   Clinical Presentation Stable/Uncomplicated   Clinical Decision Making (Complexity) Low complexity   Therapy Frequency 1 time/week   Predicted Duration of Therapy Intervention (days/wks) 90 days   Risk & Benefits of therapy have been explained Yes   Patient, Family & other staff in agreement with plan of care Yes   Clinical Impression Comments Pt is a 23 y/o female being seen for post concussive symptoms. She does not have much loss in her ROM but  she does have HA's consistently. There are fascial hypomobilities present which will contribute to her current symptoms. She is appropriate for skilled PT to reach all stated goals.   ORTHO GOALS   PT Ortho Eval Goals 1;2;3   Ortho Goal 1   Goal Identifier 1   Goal Description Pt will decrease her HA's to <3x/week to improve her daily function in 90 days.   Target Date 04/03/23   Ortho Goal 2   Goal Identifier 2   Goal Description Pt will verbalize no difficulty in falling asleep in 90 days.   Target Date 04/03/23   Ortho Goal 3   Goal Identifier 3   Goal Description Pt will improve concussive outcome by 10 points to show improved function in 90 days.   Target Date 04/03/23   Total Evaluation Time   PT Kalie, Low Complexity Minutes (30747) 35

## 2023-01-31 ENCOUNTER — HOSPITAL ENCOUNTER (OUTPATIENT)
Dept: PHYSICAL THERAPY | Facility: REHABILITATION | Age: 23
Discharge: HOME | End: 2023-01-31
Attending: NURSE PRACTITIONER
Payer: COMMERCIAL

## 2023-01-31 DIAGNOSIS — M79.18 MYOFASCIAL PAIN: ICD-10-CM

## 2023-01-31 DIAGNOSIS — F07.81 POST CONCUSSION SYNDROME: Primary | ICD-10-CM

## 2023-01-31 DIAGNOSIS — G44.219 EPISODIC TENSION-TYPE HEADACHE, NOT INTRACTABLE: ICD-10-CM

## 2023-01-31 PROCEDURE — 97140 MANUAL THERAPY 1/> REGIONS: CPT | Mod: GP | Performed by: PHYSICAL THERAPIST

## 2023-02-07 ENCOUNTER — HOSPITAL ENCOUNTER (OUTPATIENT)
Dept: PHYSICAL THERAPY | Facility: REHABILITATION | Age: 23
Discharge: HOME | End: 2023-02-07
Attending: NURSE PRACTITIONER
Payer: COMMERCIAL

## 2023-02-07 DIAGNOSIS — M79.18 MYOFASCIAL PAIN: ICD-10-CM

## 2023-02-07 DIAGNOSIS — F07.81 POST CONCUSSION SYNDROME: Primary | ICD-10-CM

## 2023-02-07 DIAGNOSIS — G44.219 EPISODIC TENSION-TYPE HEADACHE, NOT INTRACTABLE: ICD-10-CM

## 2023-02-07 PROCEDURE — 97140 MANUAL THERAPY 1/> REGIONS: CPT | Mod: GP | Performed by: PHYSICAL THERAPIST

## 2023-02-14 ENCOUNTER — HOSPITAL ENCOUNTER (OUTPATIENT)
Dept: PHYSICAL THERAPY | Facility: REHABILITATION | Age: 23
Discharge: HOME | End: 2023-02-14
Payer: COMMERCIAL

## 2023-02-14 DIAGNOSIS — F07.81 POST CONCUSSION SYNDROME: Primary | ICD-10-CM

## 2023-02-14 DIAGNOSIS — G44.219 EPISODIC TENSION-TYPE HEADACHE, NOT INTRACTABLE: ICD-10-CM

## 2023-02-14 DIAGNOSIS — M79.18 MYOFASCIAL PAIN: ICD-10-CM

## 2023-02-14 PROCEDURE — 97140 MANUAL THERAPY 1/> REGIONS: CPT | Mod: GP | Performed by: PHYSICAL THERAPIST

## 2023-02-21 ENCOUNTER — HOSPITAL ENCOUNTER (OUTPATIENT)
Dept: PHYSICAL THERAPY | Facility: REHABILITATION | Age: 23
Discharge: HOME | End: 2023-02-21
Payer: COMMERCIAL

## 2023-02-21 DIAGNOSIS — G44.219 EPISODIC TENSION-TYPE HEADACHE, NOT INTRACTABLE: ICD-10-CM

## 2023-02-21 DIAGNOSIS — F07.81 POST CONCUSSION SYNDROME: Primary | ICD-10-CM

## 2023-02-21 DIAGNOSIS — M79.18 MYOFASCIAL PAIN: ICD-10-CM

## 2023-02-21 PROCEDURE — 97140 MANUAL THERAPY 1/> REGIONS: CPT | Mod: GP | Performed by: PHYSICAL THERAPIST

## 2023-03-01 ENCOUNTER — HOSPITAL ENCOUNTER (EMERGENCY)
Facility: CLINIC | Age: 23
Discharge: HOME | End: 2023-03-01
Attending: EMERGENCY MEDICINE | Admitting: EMERGENCY MEDICINE
Payer: OTHER MISCELLANEOUS

## 2023-03-01 VITALS
SYSTOLIC BLOOD PRESSURE: 149 MMHG | BODY MASS INDEX: 30.27 KG/M2 | DIASTOLIC BLOOD PRESSURE: 98 MMHG | OXYGEN SATURATION: 97 % | TEMPERATURE: 98.5 F | HEART RATE: 86 BPM | RESPIRATION RATE: 16 BRPM | WEIGHT: 205 LBS
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
## 2023-03-01 DIAGNOSIS — S80.212A ABRASION, KNEE, LEFT, INITIAL ENCOUNTER: ICD-10-CM

## 2023-03-01 PROCEDURE — 99282 EMERGENCY DEPT VISIT SF MDM: CPT

## 2023-03-01 NOTE — Clinical Note
Katie Hurtado was seen and treated in our emergency department on 3/1/2023.  She may return to work on 03/03/2023.       If you have any questions or concerns, please don't hesitate to call.      Benjy Pinto, RN

## 2023-03-02 NOTE — ED PROVIDER NOTES
EMERGENCY DEPARTMENT ENCOUnter      NAME: Katie Hurtado  AGE: 22 year old female  YOB: 2000  MRN: [id number]  EVALUATION DATE & TIME: No admission date for patient encounter.    PCP: [facility]    ED PROVIDER: Lida Iraheta MD      Chief Complaint   Patient presents with     Fall     Headache         FINAL IMPRESSION:  1. Fall, initial encounter    2. Abrasion, knee, left, initial encounter          ED COURSE & MEDICAL DECISION MAKING:      In summary, the patient is a 22-year-old female that fell when she slipped on ice.  Patient denies striking her head and has a small abrasion to her left knee.  I think her headache is likely a tension headache and not related to her trauma since she did not strike her head.  I think she can treat her headache and nausea at home with follow-up with her primary care doctor as needed.  Do not think any emergent imaging is indicated at this time.  2230 I met with patient for initial interview and encounter. PPE worn includes exam gloves and N95 mask.     At the conclusion of the encounter I discussed the results of all of the tests and the disposition. The questions were answered. The patient or family acknowledged understanding and was agreeable with the care plan.         MEDICATIONS GIVEN IN THE EMERGENCY:  Medications - No data to display    NEW PRESCRIPTIONS STARTED AT TODAY'S ER VISIT  New Prescriptions    No medications on file          =================================================================    HPI        Katie Hurtado is a 22 year old female with a pertinent history of autism disorder, seizure like activity who presents to this ED via walk in  for evaluation of fall and headache.  The patient denies hitting her head.  She states that she has a mild diffuse headache which she describes as sharp, 5 out of 10 in intensity and not relieved or exacerbated by any particular activity.  He states that she does feel nauseated.   She also has pain over her left knee where she fell and has a small abrasion.  She is able to ambulate with minimal difficulty.  Her tetanus is up-to-date.          REVIEW OF SYSTEMS     Constitutional:  Denies fever or chills  HENT:  Denies sore throat   Respiratory:  Denies cough or shortness of breath   Cardiovascular:  Denies chest pain or palpitations  GI:   nausea,   Musculoskeletal:  Denies any new extremity pain   Skin:  Abrasion left knee  Neurologic:  headache, focal weakness or sensory changes    All other systems reviewed and are negative      PAST MEDICAL HISTORY:  Past Medical History:   Diagnosis Date     Heartburn        PAST SURGICAL HISTORY:  Past Surgical History:   Procedure Laterality Date     ARTHROSCOPY KNEE WITH PATELLAR REALIGNMENT Left 12/18/2015    Procedure: ARTHROSCOPY KNEE WITH PATELLAR REALIGNMENT;  Surgeon: Jennifer Funez MD;  [facility]     ARTHROSCOPY KNEE WITH RETINACULAR RELEASE Left 12/18/2015    Procedure: ARTHROSCOPY KNEE WITH RETINACULAR RELEASE MEDIAL OR LATERAL;  Surgeon: Jennifer Funez MD;  [facility]           CURRENT MEDICATIONS:    acetaminophen (TYLENOL) 325 MG tablet  Calcium Carbonate Antacid (TUMS PO)  cetirizine (ZYRTEC) 10 MG tablet  Cholecalciferol (VITAMIN D3 PO)  Elastic Bandages & Supports (KNEE BRACE) MISC  hydrOXYzine (VISTARIL) 25 MG capsule  Ibuprofen (ADVIL PO)  mirtazapine (REMERON) 15 MG tablet  sertraline (ZOLOFT) 100 MG tablet  sertraline (ZOLOFT) 50 MG tablet  vitamin B complex with vitamin C (STRESS TAB) tablet        ALLERGIES:  Allergies   Allergen Reactions     Grass      Lorazepam Other (See Comments)     Felt like paralysis  Unsure if this is the medication, however started with an A and given during hospitalization 11/2018 for sleep.  This medication would fit that description. Does tolerate alprazolam.     Neomycin      Swelling, redness on skin     Nickel      rash     Quetiapine Other (See Comments) and Unknown      Lethargy, suicidal ideation         FAMILY HISTORY:  No family history on file.    SOCIAL HISTORY:   Social History     Socioeconomic History     Marital status: Single     Spouse name: None     Number of children: None     Years of education: None     Highest education level: None   Tobacco Use     Smoking status: Never     Smokeless tobacco: Never   Substance and Sexual Activity     Alcohol use: No     Drug use: No       VITALS:  Patient Vitals for the past 24 hrs:   BP Temp Pulse Resp SpO2 Weight   03/01/23 2224 (!) 149/98 98.5  F (36.9  C) 86 16 97 % 93 kg (205 lb)       PHYSICAL EXAM    Constitutional:  Well developed, Well nourished,  HENT:  Normocephalic, Atraumatic, Bilateral external ears normal, Oropharynx moist, Nose normal.   Neck:  Normal range of motion, No meningismus, No stridor.   Eyes:  EOMI, Conjunctiva normal, No discharge.   Respiratory:  Normal breath sounds, No respiratory distress, No wheezing, No chest tenderness.   Cardiovascular:  Normal heart rate, Normal rhythm, No murmurs  GI:  Soft, No tenderness, No guarding,   Musculoskeletal:  Neurovascularly intact distally, No edema, No tenderness, No cyanosis, Good range of motion in all major joints. No tenderness to palpation or major deformities noted.   Integument:  Warm, Dry, No erythema, No rash.  2 cm abrasion just inferior to the knee with no active bleeding or foreign bodies  Lymphatic:  No lymphadenopathy noted.   Neurologic:  Alert & oriented , Normal motor function,No focal deficits noted.   Psychiatric:  Affect normal, Judgment normal, Mood normal.                I, Verona Stroud, am serving as a scribe to document services personally performed by Dr. Iraheta based on my observation and the provider's statements to me. I, Lida Iraheta MD attest that Verona Her is acting in a scribe capacity, has observed my performance of the services and has documented them in accordance with my direction.    Lida Iraheta,  MD  Emergency Medicine  [facility] EMERGENCY ROOM  [address] [facility] [address]  [phone]  Dept: [phone]       Lida Iraheta MD  03/01/23 8404

## 2023-03-02 NOTE — ED TRIAGE NOTES
Pt presents to the ED with c/o a fall, HA, and nausea. Pt reports that she fell around 9pm tonight, slipping on ice. Pt reports that she did not hit her head. Pt has abrasion to left leg. Pt denies pain to left leg. Endorses dry heaving earlier, no emesis. Reports hx of stress induced seizures.      Triage Assessment     Row Name 03/01/23 7755       Triage Assessment (Adult)    Airway WDL WDL       Respiratory WDL    Respiratory WDL WDL       Skin Circulation/Temperature WDL    Skin Circulation/Temperature WDL WDL       Cardiac WDL    Cardiac WDL WDL       Peripheral/Neurovascular WDL    Peripheral Neurovascular WDL WDL       Cognitive/Neuro/Behavioral WDL    Cognitive/Neuro/Behavioral WDL WDL

## 2023-03-02 NOTE — DISCHARGE INSTRUCTIONS
Please wash your wound with soap and water twice daily followed by antibiotic ointment  Tylenol 650 mg every 4 hours as needed for pain  Return to the emergency department for worsening problems or concerns

## 2023-03-06 ENCOUNTER — HOSPITAL ENCOUNTER (OUTPATIENT)
Dept: PHYSICAL THERAPY | Facility: REHABILITATION | Age: 23
Discharge: HOME | End: 2023-03-06
Payer: COMMERCIAL

## 2023-03-06 DIAGNOSIS — G44.219 EPISODIC TENSION-TYPE HEADACHE, NOT INTRACTABLE: ICD-10-CM

## 2023-03-06 DIAGNOSIS — F07.81 POST CONCUSSION SYNDROME: Primary | ICD-10-CM

## 2023-03-06 DIAGNOSIS — M79.18 MYOFASCIAL PAIN: ICD-10-CM

## 2023-03-06 PROCEDURE — 97140 MANUAL THERAPY 1/> REGIONS: CPT | Mod: GP | Performed by: PHYSICAL THERAPIST

## 2023-03-20 ENCOUNTER — HOSPITAL ENCOUNTER (OUTPATIENT)
Dept: PHYSICAL THERAPY | Facility: REHABILITATION | Age: 23
Discharge: HOME | End: 2023-03-20
Payer: COMMERCIAL

## 2023-03-20 DIAGNOSIS — M79.18 MYOFASCIAL PAIN: ICD-10-CM

## 2023-03-20 DIAGNOSIS — G44.219 EPISODIC TENSION-TYPE HEADACHE, NOT INTRACTABLE: ICD-10-CM

## 2023-03-20 DIAGNOSIS — F07.81 POST CONCUSSION SYNDROME: Primary | ICD-10-CM

## 2023-03-20 PROCEDURE — 97140 MANUAL THERAPY 1/> REGIONS: CPT | Mod: GP | Performed by: PHYSICAL THERAPIST

## 2023-03-30 ENCOUNTER — VIRTUAL VISIT (OUTPATIENT)
Dept: NEUROLOGY | Facility: CLINIC | Age: 23
End: 2023-03-30
Payer: COMMERCIAL

## 2023-03-30 DIAGNOSIS — Z87.820 HISTORY OF CONCUSSION: Primary | ICD-10-CM

## 2023-03-30 PROCEDURE — 99213 OFFICE O/P EST LOW 20 MIN: CPT | Mod: VID | Performed by: NURSE PRACTITIONER

## 2023-03-30 RX ORDER — ALPRAZOLAM 0.5 MG
TABLET ORAL
COMMUNITY
Start: 2023-03-07

## 2023-03-30 NOTE — PROGRESS NOTES
Virtual Visit Details    Type of service:  Video Visit     Originating Location (pt. Location): Home    Distant Location (provider location):  Off-site  Platform used for Video Visit: Rachel

## 2023-03-30 NOTE — LETTER
"    3/30/2023         RE: Katie Hurtado  [address] [address]  [place] [address]        Dear Colleague,    Thank you for referring your patient, Katie Hurtado, to the [facility]. Please see a copy of my visit note below.    Virtual Visit Details    Type of service:  Video Visit     Originating Location (pt. Location): Home    Distant Location (provider location):  Off-site  Platform used for Video Visit: [facility]              Video Visit: Concussion Follow up:   Katie Hurtado is a 22 year old female who is being evaluated via a billable video visit       The patient has been notified of the following:     \"This video visit will be conducted via a video call between you and your provider. We have found that certain health care needs can be provided without the need for a physical exam.  This service lets us provide the care you need with a short video conversation.  If a prescription is necessary we can send it directly to your pharmacy.  If lab work is needed we can place an order for that and you can then stop by our lab to have the test done at a later time.    If during the course of the call the provider feels a video visit is not appropriate, you will not be charged for this service.\"     Patient has given verbal consent to a video visit? Yes    Visit Check In:   Orders from previous visit:   Referral to PT for cranial sacral therapy to help with neck pain, patient will monitor symptoms once she starts a new job  Neuropsychological assessment completed    No   Currently doing PT  No    Completed Yes   Currently doing OT  No    Completed Yes   Currently doing ST   No    Completed No   Psychology  Yes   Return to Work/School - Yes    Currently on medication to help you sleep   Yes  Mirtazapine and Hydroxyzine  Currently on medication to help with mental health Yes     Hydroxyzine and Sertraline  for   MMD. ROLAN, Autism  Currently on medication for concentration or " ADD /ADHD      No                                                        Workman's Comp   No     Outpatient Follow up Mild TBI (Concussion)  Evaluation:   Katie Hurtado chief complaint is Post Concussion Syndrome     Is patient on a controlled substance prescribed by me?  No     HPI:      Pertinent History:   Per ED note on 10/12/22...Katie Hurtado is a 22 year old female with history of concussions and anxiety induced seizures who presents with seizures. Five months ago, the patient was kicked in the head and chest by a horse and was diagnosed with a concussion following blood work and imaging.She has had two diagnosed concussions, and she believes she has had several undiagnosed concussions. Several days ago she was hit in the head. Since this event she has had an increased number of headaches and has felt low on her feet. Yesterday, she had several episodes of dry heaving and dizziness. Subsequently, she had a 30 minute episode of paralysis in which she did not lose consciousness. Additionally, she notes numbness and tingling in both of her arms. She denies any double or blurry vision, new neck or back pain or abdominal pain. Secondary to these symptoms she called the nursing line and was advised to present here. Of note, she does have a history of anxiety induced seizures and has seen neurology for this. She does see a therapist and psychiatrist. She occasionally uses alcohol but does not use tobacco or illegal drugs. She is not on any blood thinners.     Date of accident :  10/11/22    Plan:        We discussed some treatment options and have elected to continue with the craniosacral therapy.  Patient will be discharged from the concussion clinic and all care will be transferred back to primary.    Medication Adjustment:  No medication changes    Return to Work/School -patient will continue at her part-time job    Return to clinic the patient has any return/worsening of symptoms, another concussion,  problems or concerns    Continue with the support of the clinic, reassurance, and redirection. Staff monitoring and ongoing assessments per team plan. This team will utilize appropriate emergency services if necessary. I will make myself available if concerns or problems arise.  The patient agrees to call/message before her next visit with any questions, concerns or problems.    Progress Note:        The patient returns to the concussion clinic for a follow up visit, She was last seen by me on 12/28/2022, no medication changes were made at that appointment.  The patient reports that the craniosacral therapy is improving her neck pain.  Patient also reports that she has a new job, she reports she was hired to do 20 hours a week, but is really doing 60 hours a week.  Patient reports no worsening of symptoms when she works.  Overall patient is reporting improvement in her physical, cognitive, and emotional symptoms      Subjective:        Overall improvement from last visit   Yes     Headaches:  Significant ongoing headaches Yes   Headaches: Intermittently  Improvement :Yes   Current Headache No   Wake with HA  No     Physical Symptoms:  Headache-Yes     Since last visit  Improved     Nausea-No               Balance problems - No       Dizziness - No           Visual problems - No       Fatigue - Yes              Since last visit  Improved     Sensitivity to light - No         Sensitivity to sound - No         Numbness/tingling - No            Cognitive Symptoms  Feeling mentally foggy -Yes        Since last visit  Improved     Feeling confused -No          Difficulty Concentrating- No         Difficulty remembering - No              Emotional Symptoms  Irritability - No            Sadness-  No          More emotional - No           Nervousness/anxiety -No            Sleep History:  Sleep less than usual - No    Sleep more than usual - No    Trouble falling asleep - No        Trouble staying asleep - Yes       Since  last visit  Improved     Wake feeling rested - most of the time        Since last visit  Improved        Exertion:         Do the above stated symptoms worsen with physical activity? No                   Do the above stated symptoms worsen with cognitive activity? No                Objective:   There are no problems to display for this patient.    Past Medical History:   Diagnosis Date     Heartburn      Past Surgical History:   Procedure Laterality Date     ARTHROSCOPY KNEE WITH PATELLAR REALIGNMENT Left 12/18/2015    Procedure: ARTHROSCOPY KNEE WITH PATELLAR REALIGNMENT;  Surgeon: Jennifer Funez MD;  [facility]     ARTHROSCOPY KNEE WITH RETINACULAR RELEASE Left 12/18/2015    Procedure: ARTHROSCOPY KNEE WITH RETINACULAR RELEASE MEDIAL OR LATERAL;  Surgeon: Jennifer Funez MD;  [facility]     No family history on file.  Current Outpatient Medications   Medication Sig Dispense Refill     acetaminophen (TYLENOL) 325 MG tablet Take 2 tablets (650 mg) by mouth every 4 hours as needed for other (mild pain) 100 tablet 0     ALPRAZolam (XANAX) 0.5 MG tablet        Calcium Carbonate Antacid (TUMS PO) Take 1 tablet by mouth as needed       cetirizine (ZYRTEC) 10 MG tablet Take 10 mg by mouth as needed        Cholecalciferol (VITAMIN D3 PO) Take 400 Units by mouth daily       Elastic Bandages & Supports (KNEE BRACE) [facility] Patient has Left patellar instability.    Dr. Funez has ordered a Carlos Kim Reaction brace or Q payton Brace. 1 each 0     hydrOXYzine (VISTARIL) 25 MG capsule TAKE ONE TO TWO CAPSULES BY MOUTH AT BEDTIME AS NEEDED       Ibuprofen (ADVIL PO) Take 1-2 tablets by mouth as needed for moderate pain       mirtazapine (REMERON) 15 MG tablet Take 15 mg by mouth At Bedtime       sertraline (ZOLOFT) 100 MG tablet Take 100 mg by mouth daily       sertraline (ZOLOFT) 50 MG tablet TAKE ONE TABLET BY MOUTH EVERY MORNING WITH 100 MG TABLET FOR TOTAL DOSE  MG       vitamin B complex with vitamin C  (STRESS TAB) tablet Take 1 tablet by mouth daily       Social History     Socioeconomic History     Marital status: Single     Spouse name: Not on file     Number of children: Not on file     Years of education: Not on file     Highest education level: Not on file   Occupational History     Not on file   Tobacco Use     Smoking status: Never     Smokeless tobacco: Never   Vaping Use     Vaping status: Not on file   Substance and Sexual Activity     Alcohol use: No     Drug use: No     Sexual activity: Not on file   Other Topics Concern     Not on file   Social History Narrative     Not on file     Social Determinants of Health     Financial Resource Strain: Not on file   Food Insecurity: Not on file   Transportation Needs: Not on file   Physical Activity: Not on file   Stress: Not on file   Social Connections: Not on file   Intimate Partner Violence: Not on file   Housing Stability: Not on file       ALLERGIES  Grass, Lorazepam, Neomycin, Nickel, and Quetiapine    The following portions of the patient's history were reviewed and updated as appropriate: allergies, current medications, past family history, past medical history, past social history, past surgical history and problem list.    Review of Systems  A comprehensive review of systems was negative except for: What is noted above    Mental Status Examination  Alertness:  alert  and oriented  Appearance:  casually groomed  Behavior/Demeanor:  cooperative, pleasant and calm, with good  eye contact.  Speech:  normal  Psychomotor:  normal or unremarkable    Mood:  good  Affect:  appropriate and was congruent to speech content.  Thought Process/Associations: unremarkable   Thought Content: devoid of  suicidal and violent ideation, delusions [details in Interim History] and delusions.   Perception: devoid of  auditory hallucinations and visual hallucinations  Insight:  good.  Judgment: good.  Attention/Concentration:  Normal  Language:  Intact  Fund of Knowledge:   Average.    Memory:  Immediate recall intact, Short-term memory intact and Long-term memory intact.       Counseling:   Discussion was held with the patient today regarding concussion in general including types of injury, symptoms that are common, treatment and variability in time to recover  I have reassured the patient her symptoms are very common when a concussion is present and will improve with time. We discussed the risks and benefits of possible medication used to help concussive symptoms including risk of worsening depression with medication adjustments and even the possibility of emergence of suicidal ideations. We will assess for the appropriateness of possible psychotropic medication trials/changes. The patient will seek out appropriate emergency services should that become necessary. The patient agrees to call/message before her next visit with any questions, concerns or problems.    Visit Details:   Type of service: Video Visit    Video Start Time: 0947    Video End Time:  0954    Originating Location (pt. Location): Home    Distant Location:  Distant Location (provider location):  Off-site    Base Clinic:  [facility]  [place]    Mode of Communication: Video Conference via  [employer] (My Chart)     Diagnosis managed and treated at today's visit :  Post concussion syndrome  Post concussion headache  Nausea  Dizziness  Fatigue  Insomnia  Sensitivity to light  Sound sensitivity  Concentration and Attention deficit  Memory difficulties  Anxiety d/t a medical condition  Irritability  Return to work    Total time today (10 min) in this patient encounter was spent on pre-charting, chart review, review of outside records, review of test results, interpretation of tests, patient visit and documentation and counseling and/or coordination of care. The patient is in agreement with this plan and has no further questions.    General Information:   Today you had your appointment with  Mesha Ellis CNP     If lab work was done today as part of your evaluation you will generally be contacted via My Chart, mail, or phone with the results within 1-5 days. If there is an alarming result we will contact you by phone. Lab results come back at varying times, I generally wait until all labs are resulted before making comments on results. Please note labs are automatically released to My Chart once available.     If you need refills please contact your pharmacist. They will send a refill request to me to review. If it is a controlled substance please message me through [employer]. Please allow 3 business days for us to process all refill requests.     Please call or send a medical message through My Chart, with any questions or concerns    If you need any paperwork completed please fax forms to [phone]. Please state if you would like a copy of the completed paperwork, mailed or faxed back to the patient and a fax number to fax the paperwork to. Please allow up to 10 business days for paperwork to be completed.    TAMRA Prince, CNP      [facility]  [facility] [address]  [address] [place] [id number]  Office: [phone]  Fax: [phone]          Again, thank you for allowing me to participate in the care of your patient.        Sincerely,        TAMRA Prince CNP

## 2023-03-30 NOTE — NURSING NOTE
CONCUSSION SYMPTOMS ASSESSMENT 10/19/2022 12/28/2022 3/30/2023   Headache or Pressure In Head 4 - moderate to severe 2 - mild to moderate 1 - mild   Upset Stomach or Throwing Up 2 - mild to moderate 0 - none 0 - none   Problems with Balance 5 - severe 0 - none 0 - none   Feeling Dizzy 4 - moderate to severe 0 - none 0 - none   Sensitivity to Light 0 - none 0 - none 0 - none   Sensitivity to Noise 1 - mild 0 - none 1 - mild   Mood Changes 1 - mild 0 - none 1 - mild   Feeling sluggish, hazy, or foggy 5 - severe 1 - mild 1 - mild   Trouble Concentrating, Lack of Focus 1 - mild 0 - none 0 - none   Motion Sickness 0 - none 0 - none 0 - none   Vision Changes 0 - none 0 - none 0 - none   Memory Problems 2 - mild to moderate 0 - none 0 - none   Feeling Confused 1 - mild 0 - none 0 - none   Neck Pain 3 - moderate 3 - moderate 2 - mild to moderate   Trouble Sleeping 1 - mild 1 - mild 1 - mild   Total Number of Symptoms 12 4 6   Symptom Severity Score 30 7 7

## 2023-04-04 NOTE — PROGRESS NOTES
"  Video Visit: Concussion Follow up:   Katie Hurtado is a 22 year old female who is being evaluated via a billable video visit       The patient has been notified of the following:     \"This video visit will be conducted via a video call between you and your provider. We have found that certain health care needs can be provided without the need for a physical exam.  This service lets us provide the care you need with a short video conversation.  If a prescription is necessary we can send it directly to your pharmacy.  If lab work is needed we can place an order for that and you can then stop by our lab to have the test done at a later time.    If during the course of the call the provider feels a video visit is not appropriate, you will not be charged for this service.\"     Patient has given verbal consent to a video visit? Yes    Visit Check In:   Orders from previous visit:   Referral to PT for cranial sacral therapy to help with neck pain, patient will monitor symptoms once she starts a new job  Neuropsychological assessment completed    No   Currently doing PT  No    Completed Yes   Currently doing OT  No    Completed Yes   Currently doing ST   No    Completed No   Psychology  Yes   Return to Work/School - Yes    Currently on medication to help you sleep   Yes  Mirtazapine and Hydroxyzine  Currently on medication to help with mental health Yes     Hydroxyzine and Sertraline  for   MMD. ROLAN, Autism  Currently on medication for concentration or ADD /ADHD      No                                                        Workman's Comp   No     Outpatient Follow up Mild TBI (Concussion)  Evaluation:   Katie Hurtado chief complaint is Post Concussion Syndrome     Is patient on a controlled substance prescribed by me?  No     HPI:      Pertinent History:   Per ED note on 10/12/22...Katie Hurtado is a 22 year old female with history of concussions and anxiety induced seizures who presents with " seizures. Five months ago, the patient was kicked in the head and chest by a horse and was diagnosed with a concussion following blood work and imaging.She has had two diagnosed concussions, and she believes she has had several undiagnosed concussions. Several days ago she was hit in the head. Since this event she has had an increased number of headaches and has felt low on her feet. Yesterday, she had several episodes of dry heaving and dizziness. Subsequently, she had a 30 minute episode of paralysis in which she did not lose consciousness. Additionally, she notes numbness and tingling in both of her arms. She denies any double or blurry vision, new neck or back pain or abdominal pain. Secondary to these symptoms she called the nursing line and was advised to present here. Of note, she does have a history of anxiety induced seizures and has seen neurology for this. She does see a therapist and psychiatrist. She occasionally uses alcohol but does not use tobacco or illegal drugs. She is not on any blood thinners.     Date of accident :  10/11/22    Plan:        We discussed some treatment options and have elected to continue with the craniosacral therapy.  Patient will be discharged from the concussion clinic and all care will be transferred back to primary.    Medication Adjustment:  No medication changes    Return to Work/School -patient will continue at her part-time job    Return to clinic the patient has any return/worsening of symptoms, another concussion, problems or concerns    Continue with the support of the clinic, reassurance, and redirection. Staff monitoring and ongoing assessments per team plan. This team will utilize appropriate emergency services if necessary. I will make myself available if concerns or problems arise.  The patient agrees to call/message before her next visit with any questions, concerns or problems.    Progress Note:        The patient returns to the concussion clinic for a follow  up visit, She was last seen by me on 12/28/2022, no medication changes were made at that appointment.  The patient reports that the craniosacral therapy is improving her neck pain.  Patient also reports that she has a new job, she reports she was hired to do 20 hours a week, but is really doing 60 hours a week.  Patient reports no worsening of symptoms when she works.  Overall patient is reporting improvement in her physical, cognitive, and emotional symptoms      Subjective:        Overall improvement from last visit   Yes     Headaches:  Significant ongoing headaches Yes   Headaches: Intermittently  Improvement :Yes   Current Headache No   Wake with HA  No     Physical Symptoms:  Headache-Yes     Since last visit  Improved     Nausea-No               Balance problems - No       Dizziness - No           Visual problems - No       Fatigue - Yes              Since last visit  Improved     Sensitivity to light - No         Sensitivity to sound - No         Numbness/tingling - No            Cognitive Symptoms  Feeling mentally foggy -Yes        Since last visit  Improved     Feeling confused -No          Difficulty Concentrating- No         Difficulty remembering - No              Emotional Symptoms  Irritability - No            Sadness-  No          More emotional - No           Nervousness/anxiety -No            Sleep History:  Sleep less than usual - No    Sleep more than usual - No    Trouble falling asleep - No        Trouble staying asleep - Yes       Since last visit  Improved     Wake feeling rested - most of the time        Since last visit  Improved        Exertion:         Do the above stated symptoms worsen with physical activity? No                   Do the above stated symptoms worsen with cognitive activity? No                Objective:   There are no problems to display for this patient.    Past Medical History:   Diagnosis Date     Heartburn      Past Surgical History:   Procedure Laterality Date      ARTHROSCOPY KNEE WITH PATELLAR REALIGNMENT Left 12/18/2015    Procedure: ARTHROSCOPY KNEE WITH PATELLAR REALIGNMENT;  Surgeon: Jennifer Funez MD;  [facility]     ARTHROSCOPY KNEE WITH RETINACULAR RELEASE Left 12/18/2015    Procedure: ARTHROSCOPY KNEE WITH RETINACULAR RELEASE MEDIAL OR LATERAL;  Surgeon: Jennifer Funez MD;  Location[facility]     No family history on file.  Current Outpatient Medications   Medication Sig Dispense Refill     acetaminophen (TYLENOL) 325 MG tablet Take 2 tablets (650 mg) by mouth every 4 hours as needed for other (mild pain) 100 tablet 0     ALPRAZolam (XANAX) 0.5 MG tablet        Calcium Carbonate Antacid (TUMS PO) Take 1 tablet by mouth as needed       cetirizine (ZYRTEC) 10 MG tablet Take 10 mg by mouth as needed        Cholecalciferol (VITAMIN D3 PO) Take 400 Units by mouth daily       Elastic Bandages & Supports (KNEE BRACE) [facility] Patient has Left patellar instability.    Dr. Funez has ordered a Carlos Kim Reaction brace or Q payton Brace. 1 each 0     hydrOXYzine (VISTARIL) 25 MG capsule TAKE ONE TO TWO CAPSULES BY MOUTH AT BEDTIME AS NEEDED       Ibuprofen (ADVIL PO) Take 1-2 tablets by mouth as needed for moderate pain       mirtazapine (REMERON) 15 MG tablet Take 15 mg by mouth At Bedtime       sertraline (ZOLOFT) 100 MG tablet Take 100 mg by mouth daily       sertraline (ZOLOFT) 50 MG tablet TAKE ONE TABLET BY MOUTH EVERY MORNING WITH 100 MG TABLET FOR TOTAL DOSE  MG       vitamin B complex with vitamin C (STRESS TAB) tablet Take 1 tablet by mouth daily       Social History     Socioeconomic History     Marital status: Single     Spouse name: Not on file     Number of children: Not on file     Years of education: Not on file     Highest education level: Not on file   Occupational History     Not on file   Tobacco Use     Smoking status: Never     Smokeless tobacco: Never   Vaping Use     Vaping status: Not on file   Substance and Sexual Activity     Alcohol use:  No     Drug use: No     Sexual activity: Not on file   Other Topics Concern     Not on file   Social History Narrative     Not on file     Social Determinants of Health     Financial Resource Strain: Not on file   Food Insecurity: Not on file   Transportation Needs: Not on file   Physical Activity: Not on file   Stress: Not on file   Social Connections: Not on file   Intimate Partner Violence: Not on file   Housing Stability: Not on file       ALLERGIES  Grass, Lorazepam, Neomycin, Nickel, and Quetiapine    The following portions of the patient's history were reviewed and updated as appropriate: allergies, current medications, past family history, past medical history, past social history, past surgical history and problem list.    Review of Systems  A comprehensive review of systems was negative except for: What is noted above    Mental Status Examination  Alertness:  alert  and oriented  Appearance:  casually groomed  Behavior/Demeanor:  cooperative, pleasant and calm, with good  eye contact.  Speech:  normal  Psychomotor:  normal or unremarkable    Mood:  good  Affect:  appropriate and was congruent to speech content.  Thought Process/Associations: unremarkable   Thought Content: devoid of  suicidal and violent ideation, delusions [details in Interim History] and delusions.   Perception: devoid of  auditory hallucinations and visual hallucinations  Insight:  good.  Judgment: good.  Attention/Concentration:  Normal  Language:  Intact  Fund of Knowledge:  Average.    Memory:  Immediate recall intact, Short-term memory intact and Long-term memory intact.       Counseling:   Discussion was held with the patient today regarding concussion in general including types of injury, symptoms that are common, treatment and variability in time to recover  I have reassured the patient her symptoms are very common when a concussion is present and will improve with time. We discussed the risks and benefits of possible medication  used to help concussive symptoms including risk of worsening depression with medication adjustments and even the possibility of emergence of suicidal ideations. We will assess for the appropriateness of possible psychotropic medication trials/changes. The patient will seek out appropriate emergency services should that become necessary. The patient agrees to call/message before her next visit with any questions, concerns or problems.    Visit Details:   Type of service: Video Visit    Video Start Time: 0947    Video End Time:  0954    Originating Location (pt. Location): Home    Distant Location:  Distant Location (provider location):  Off-site    Base Clinic:  [facility]  [place]    Mode of Communication: Video Conference via  [employer] [facility] (My Chart)     Diagnosis managed and treated at today's visit :  Post concussion syndrome  Post concussion headache  Nausea  Dizziness  Fatigue  Insomnia  Sensitivity to light  Sound sensitivity  Concentration and Attention deficit  Memory difficulties  Anxiety d/t a medical condition  Irritability  Return to work    Total time today (10 min) in this patient encounter was spent on pre-charting, chart review, review of outside records, review of test results, interpretation of tests, patient visit and documentation and counseling and/or coordination of care. The patient is in agreement with this plan and has no further questions.    General Information:   Today you had your appointment with Mesha Ellis CNP     If lab work was done today as part of your evaluation you will generally be contacted via My Chart, mail, or phone with the results within 1-5 days. If there is an alarming result we will contact you by phone. Lab results come back at varying times, I generally wait until all labs are resulted before making comments on results. Please note labs are automatically released to My Chart once available.     If you need refills please contact your  pharmacist. They will send a refill request to me to review. If it is a controlled substance please message me through [employer]. Please allow 3 business days for us to process all refill requests.     Please call or send a medical message through My Chart, with any questions or concerns    If you need any paperwork completed please fax forms to [phone]. Please state if you would like a copy of the completed paperwork, mailed or faxed back to the patient and a fax number to fax the paperwork to. Please allow up to 10 business days for paperwork to be completed.    TAMRA Prince, CNP      [facility]  [facility] [address]  [address] [place] [id number]  Office: [phone]  Fax: [phone]

## 2023-05-09 ENCOUNTER — HOSPITAL ENCOUNTER (OUTPATIENT)
Dept: PHYSICAL THERAPY | Facility: REHABILITATION | Age: 23
Discharge: HOME | End: 2023-05-09
Payer: OTHER MISCELLANEOUS

## 2023-05-09 DIAGNOSIS — F07.81 POST CONCUSSION SYNDROME: Primary | ICD-10-CM

## 2023-05-09 DIAGNOSIS — M79.18 MYOFASCIAL PAIN: ICD-10-CM

## 2023-05-09 DIAGNOSIS — G44.219 EPISODIC TENSION-TYPE HEADACHE, NOT INTRACTABLE: ICD-10-CM

## 2023-05-09 PROCEDURE — 97140 MANUAL THERAPY 1/> REGIONS: CPT | Mod: GP | Performed by: PHYSICAL THERAPIST

## 2023-05-23 ENCOUNTER — THERAPY VISIT (OUTPATIENT)
Dept: PHYSICAL THERAPY | Facility: REHABILITATION | Age: 23
End: 2023-05-23
Payer: COMMERCIAL

## 2023-05-23 DIAGNOSIS — M79.18 MYOFASCIAL PAIN: ICD-10-CM

## 2023-05-23 DIAGNOSIS — F07.81 POST CONCUSSION SYNDROME: Primary | ICD-10-CM

## 2023-05-23 DIAGNOSIS — G44.219 EPISODIC TENSION-TYPE HEADACHE, NOT INTRACTABLE: ICD-10-CM

## 2023-05-23 PROCEDURE — 97140 MANUAL THERAPY 1/> REGIONS: CPT | Mod: GP | Performed by: PHYSICAL THERAPIST

## 2023-06-03 ENCOUNTER — HEALTH MAINTENANCE LETTER (OUTPATIENT)
Age: 23
End: 2023-06-03

## 2023-10-04 NOTE — PROGRESS NOTES
DISCHARGE  Reason for Discharge: Patient chooses to discontinue therapy.  Patient has failed to schedule further appointments.    Equipment Issued:     Discharge Plan: Patient to continue home program.    Referring Provider:  Mesha Ellis

## 2024-07-07 ENCOUNTER — HEALTH MAINTENANCE LETTER (OUTPATIENT)
Age: 24
End: 2024-07-07

## 2025-02-11 ENCOUNTER — OFFICE VISIT (OUTPATIENT)
Dept: FAMILY MEDICINE | Facility: CLINIC | Age: 25
End: 2025-02-11
Payer: COMMERCIAL

## 2025-02-11 VITALS
RESPIRATION RATE: 20 BRPM | DIASTOLIC BLOOD PRESSURE: 91 MMHG | WEIGHT: 293 LBS | TEMPERATURE: 97.9 F | SYSTOLIC BLOOD PRESSURE: 138 MMHG | HEIGHT: 69 IN | HEART RATE: 60 BPM | OXYGEN SATURATION: 96 % | BODY MASS INDEX: 43.4 KG/M2

## 2025-02-11 DIAGNOSIS — Q79.60 EHLERS-DANLOS SYNDROME: Primary | ICD-10-CM

## 2025-02-11 DIAGNOSIS — R53.82 CHRONIC FATIGUE: ICD-10-CM

## 2025-02-11 DIAGNOSIS — Z87.898 HISTORY OF PSYCHOGENIC NONEPILEPTIC SEIZURE: ICD-10-CM

## 2025-02-11 DIAGNOSIS — R03.0 ELEVATED BLOOD PRESSURE READING WITHOUT DIAGNOSIS OF HYPERTENSION: ICD-10-CM

## 2025-02-11 DIAGNOSIS — M35.7 JOINT HYPERMOBILITY SYNDROME INVOLVING FINGER: ICD-10-CM

## 2025-02-11 PROCEDURE — G2211 COMPLEX E/M VISIT ADD ON: HCPCS | Performed by: FAMILY MEDICINE

## 2025-02-11 PROCEDURE — 99205 OFFICE O/P NEW HI 60 MIN: CPT | Performed by: FAMILY MEDICINE

## 2025-02-11 RX ORDER — MONTELUKAST SODIUM 10 MG/1
10 TABLET ORAL AT BEDTIME
Qty: 30 TABLET | Refills: 3 | Status: SHIPPED | OUTPATIENT
Start: 2025-02-11

## 2025-02-11 RX ORDER — PROPRANOLOL HYDROCHLORIDE 10 MG/1
10-40 TABLET ORAL
Qty: 100 TABLET | Refills: 0 | Status: SHIPPED | OUTPATIENT
Start: 2025-02-11

## 2025-02-11 RX ORDER — SERTRALINE HYDROCHLORIDE 25 MG/1
25 TABLET, FILM COATED ORAL DAILY
COMMUNITY

## 2025-02-11 RX ORDER — FAMOTIDINE 40 MG/1
40 TABLET, FILM COATED ORAL DAILY
Qty: 30 TABLET | Refills: 3 | Status: SHIPPED | OUTPATIENT
Start: 2025-02-11

## 2025-02-11 ASSESSMENT — ANXIETY QUESTIONNAIRES
3. WORRYING TOO MUCH ABOUT DIFFERENT THINGS: MORE THAN HALF THE DAYS
GAD7 TOTAL SCORE: 12
8. IF YOU CHECKED OFF ANY PROBLEMS, HOW DIFFICULT HAVE THESE MADE IT FOR YOU TO DO YOUR WORK, TAKE CARE OF THINGS AT HOME, OR GET ALONG WITH OTHER PEOPLE?: VERY DIFFICULT
7. FEELING AFRAID AS IF SOMETHING AWFUL MIGHT HAPPEN: SEVERAL DAYS
2. NOT BEING ABLE TO STOP OR CONTROL WORRYING: SEVERAL DAYS
5. BEING SO RESTLESS THAT IT IS HARD TO SIT STILL: MORE THAN HALF THE DAYS
7. FEELING AFRAID AS IF SOMETHING AWFUL MIGHT HAPPEN: SEVERAL DAYS
4. TROUBLE RELAXING: NEARLY EVERY DAY
IF YOU CHECKED OFF ANY PROBLEMS ON THIS QUESTIONNAIRE, HOW DIFFICULT HAVE THESE PROBLEMS MADE IT FOR YOU TO DO YOUR WORK, TAKE CARE OF THINGS AT HOME, OR GET ALONG WITH OTHER PEOPLE: VERY DIFFICULT
GAD7 TOTAL SCORE: 12
GAD7 TOTAL SCORE: 12
1. FEELING NERVOUS, ANXIOUS, OR ON EDGE: SEVERAL DAYS
6. BECOMING EASILY ANNOYED OR IRRITABLE: MORE THAN HALF THE DAYS

## 2025-02-11 ASSESSMENT — PATIENT HEALTH QUESTIONNAIRE - PHQ9
10. IF YOU CHECKED OFF ANY PROBLEMS, HOW DIFFICULT HAVE THESE PROBLEMS MADE IT FOR YOU TO DO YOUR WORK, TAKE CARE OF THINGS AT HOME, OR GET ALONG WITH OTHER PEOPLE: VERY DIFFICULT
SUM OF ALL RESPONSES TO PHQ QUESTIONS 1-9: 13
SUM OF ALL RESPONSES TO PHQ QUESTIONS 1-9: 13

## 2025-02-11 NOTE — PATIENT INSTRUCTIONS
Talk with your psychiatrist about switching to duloxetine to help with both mod and pain.    Beta Blockers   Propranolol   Start with 10 mg at bedtime   Increase by 10 mg every 4-5 days until fewer awakenings, side  effects, or no further benefit   Switch to long-acting if needed   Take some earlier if needed to offset  second wind    Often need smaller daytime dose as well   If ineffective or not tolerated, try a different beta blocker      The body braid

## 2025-02-11 NOTE — PROGRESS NOTES
Assessment & Plan   Problem List Items Addressed This Visit    None  Visit Diagnoses       Irwin-Danlos syndrome    -  Primary    Relevant Medications    famotidine (PEPCID) 40 MG tablet    montelukast (SINGULAIR) 10 MG tablet    propranolol (INDERAL) 10 MG tablet    Other Relevant Orders    Echocardiogram Complete    History of psychogenic nonepileptic seizure        Joint hypermobility syndrome involving finger        Relevant Orders    Occupational Therapy  Referral        Patient is struggled with joint hypermobility.  She had to have surgery on her right knee due to the number of kneecap dislocations she has had.  Her Beighton score is a 6 out of 9 with 2 points for the thumb, the elbow, and the knees.  As a child she could place her hands flat on the floor without bending her knees, she amused her friends by contorting her body into strange shapes and could do the splits, she had spontaneous shoulder or knee Dislocation some more than 1 occasion and is always considered herself double-jointed.    She has unusually softer velvety skin, she had stretch marks on her thighs prior to puberty and was not overweight at the time.  She has bilateral peizogenic papules of the heels, she has atrophic scarring on her arms, knees, and face.  She has dental crowding and a higher narrow palate.  She also has musculoskeletal pain in 2 or more limbs recurring daily for at least 3 months, chronic widespread pain for greater than or equal to 3 months, recurrent joint dislocations or olivia joint instability in the absence of trauma.      Chronic fatigue, obesity, depression.  She does see a psychiatrist.    Assessment and plan Irwin-Danlos syndrome joint hypermobility subtype.  Will get an echocardiogram, we will see if famotidine and montelukast help with some of her mast cell symptoms and we will get her started on some propranolol to see if this helps with her sleep.  Add like her to spend some time on the  "Irwin-Danlos Society's website and recommend that we get together in 2 to 3 months to see how she is doing.  Suggested she try to look into the book disjointed and living well with orthostatic intolerance.    The longitudinal plan of care for the diagnosis(es)/condition(s) as documented were addressed during this visit. Due to the added complexity in care, I will continue to support Katie in the subsequent management and with ongoing continuity of care.      Total time spent reviewing chart and preparing for appointment, with patient for appointment, and time spent charting and coordinating care on the day of the appointment in minutes was: 72   She has no personal or family history of spontaneous pneumothorax, spontaneous rupture of bowel bladder or blood vessels, congenital hip dysplasia, sudden death, clubfeet, or drowning.                 BMI  Estimated body mass index is 45.63 kg/m  as calculated from the following:    Height as of this encounter: 1.753 m (5' 9\").    Weight as of this encounter: 140.2 kg (309 lb).       Depression Screening Follow Up        2/11/2025    10:39 AM   PHQ   PHQ-9 Total Score 13    Q9: Thoughts of better off dead/self-harm past 2 weeks Not at all       Patient-reported           Follow Up Actions Taken  Crisis resource information provided in After Visit Summary           Subjective   Katie is a 24 year old, presenting for the following health issues:  Mobility Issues (New pt here to discuss Hypermobility Issues in hands and joints)         No data to display              History of Present Illness       Reason for visit:  Hypermobility support She is missing 2 dose(s) of medications per week.  She is not taking prescribed medications regularly due to remembering to take.                     Objective    BP (!) 138/91 (BP Location: Right arm, Patient Position: Sitting, Cuff Size: Adult Large)   Pulse 60   Temp 97.9  F (36.6  C) (Tympanic)   Resp 20   Ht 1.753 m (5' 9\")   Wt " (!) 140.2 kg (309 lb)   SpO2 96%   BMI 45.63 kg/m    Body mass index is 45.63 kg/m .  Physical Exam               Signed Electronically by: Ofelia Lockhart MD

## 2025-02-21 ENCOUNTER — HOSPITAL ENCOUNTER (OUTPATIENT)
Dept: CARDIOLOGY | Facility: CLINIC | Age: 25
Discharge: HOME | End: 2025-02-21
Attending: FAMILY MEDICINE | Admitting: FAMILY MEDICINE
Payer: COMMERCIAL

## 2025-02-21 DIAGNOSIS — Q79.60 EHLERS-DANLOS SYNDROME: ICD-10-CM

## 2025-02-21 PROCEDURE — 255N000002 HC RX 255 OP 636: Performed by: FAMILY MEDICINE

## 2025-02-21 PROCEDURE — C8929 TTE W OR WO FOL WCON,DOPPLER: HCPCS

## 2025-02-21 PROCEDURE — 93306 TTE W/DOPPLER COMPLETE: CPT | Mod: 26 | Performed by: INTERNAL MEDICINE

## 2025-02-21 RX ADMIN — PERFLUTREN 3 ML: 6.52 INJECTION, SUSPENSION INTRAVENOUS at 11:35

## 2025-03-26 ENCOUNTER — THERAPY VISIT (OUTPATIENT)
Dept: OCCUPATIONAL THERAPY | Facility: REHABILITATION | Age: 25
End: 2025-03-26
Attending: FAMILY MEDICINE
Payer: COMMERCIAL

## 2025-03-26 DIAGNOSIS — M35.7 JOINT HYPERMOBILITY SYNDROME INVOLVING FINGER: ICD-10-CM

## 2025-03-26 PROCEDURE — 97535 SELF CARE MNGMENT TRAINING: CPT | Mod: GO

## 2025-03-26 PROCEDURE — 97110 THERAPEUTIC EXERCISES: CPT | Mod: GO

## 2025-03-26 PROCEDURE — 97165 OT EVAL LOW COMPLEX 30 MIN: CPT | Mod: GO

## 2025-03-26 NOTE — PROGRESS NOTES
OCCUPATIONAL THERAPY EVALUATION  Type of Visit: Evaluation              Subjective        Presenting condition or subjective complaint: Irwin danlos syndrome  Date of onset: 02/11/25 (MD order date)    Relevant medical history: Concussions; Depression; Migraines or headaches; Overweight; Seizures   Dates & types of surgery: MPL replacement [id number]    Prior diagnostic imaging/testing results: MRI; CT scan; X-ray     Prior therapy history for the same diagnosis, illness or injury: Yes Too many and many therapies    Prior Level of Function  Indep in all areas    Living Environment  Social support: With family members   Type of home: House   Stairs to enter the home: Yes 5 Is there a railing: Yes     Ramp: No   Stairs inside the home: Yes 20 Is there a railing: Yes     Help at home: None  Equipment owned: Crutches     Employment: Yes Massage therapist  Hobbies/Interests: Horses    Patient goals for therapy: Not have dislocations    Pain assessment: Pain present    Massage helps w/ pain   Hyperextension with pressure - IP joints  Wrists unstable  Thumb MP joints     Silver ring splints - can tolerate while doing massage     Compression gloves - has been helpful during work  Horse massage - able to use compression gloves     Heat does help with pain, she also ices if there is swelling after overuse.      Objective     Upper Extremity Functional Index Score:  SCORE:   Column Totals: /80: (Patient-Rptd) 77   (A lower score indicates greater disability.)    Right hand dominant  Patient reports symptoms of pain, weakness/loss of strength, edema, numbness, and tingling     Observation and Pt Comments/Goals: see subjective    Pain Level (Scale 0-10)   3/26/2025   At Rest 0   With Use Up to 6      Pain Description  Date 3/26/2025   Location Radial wrist, dorsal wrist, digits    Pain Quality Aching and Sharp   Frequency intermittent     Pain is worst  daytime   Exacerbated by  Repetitive use   Relieved by Heat, ice,  compression, splinting   Progression unchanged     Beighton Hypermobility Scale:    Beighton Hypermobility Scale 3/26/2025   B elbow (2) 2   B knees (2) 2   B thumbs (2) 2 (does demo instability)   B small finger HE (2) 0    Bend and touch floor w/flat palms (1) 0   Score: (0-9) 6       Sensation   Decreased Median Nerve distribution per pt report - intermittent     ROM:  NOTE: Wrist ROM is WNL all planes. Noted below is pain with end range  Wrist 3/26/2025   AROM (PROM)    Extension R:-  L: +   Flexion R:-  L:+   RD R:  L:   UD R: +   L: +   Supination R:-  L: +   Pronation R:-  L: +     ROM: Fingers: Noted to be  WNL in flexion and extension   Comments on Deviation or hyperextension noted: MCP: WNL PIP: RF mild hyper ext DIP: WNL   Pt comments on stability of fingers: limited hyperextension with AROM, DIP and PIP hyperext/collapse w/ pressure    MP laxity of collateral ligaments luiz thumbs    ROM  Thumb 3/26/2025 3/26/2025   AROM  (PROM) R L   MP / /   IP / /   RABD     PABD     Retropulsion     Kapandji Opposition Scale (0-10/10) - +     Strength   (Measured in pounds, one trial only)  Pain Report: - none  + mild    ++ moderate    +++ severe    3/26/2025    R: 85 + RF EDC  L:76 + radial wrist; RF EDC     Lateral Pinch 3/26/2025    R: 24 MP collapse   L: 20 MP collapse +      3 pt. pinch 3/26/2025    R: 22+ PIP collapse  L: 20+ PIP collapse        Palpation  3/26/2025   FA flexors Luiz: -   FA extensors Luiz: -   LEP Luiz: +   Web space R: + tight not pain   L: -    Thenar eminence  Luiz: -    Scaphoid R: + L: -    FCR Luiz: +       Observation:   EDC subluxation - RF luiz mild?      Assessment & Plan   CLINICAL IMPRESSIONS  Medical Diagnosis: Digit hypermobility, EDS    Treatment Diagnosis: Digit hypermobility, pain    Impression/Assessment: Pt is a 24 year old female presenting to Occupational Therapy due to luiz digit hypermobility.  The following significant findings have been identified: Impaired activity  tolerance, Impaired sensation, and Pain.  These identified deficits interfere with their ability to perform work tasks, recreational activities, and household chores as compared to previous level of function.   Patient's limitations or Problem List includes: Pain, Sensory disturbance, Decreased stability, Hypermobility, and Tightness in musculature of the bilateral wrist, hand, and thumb which interferes with the patient's ability to perform Work Tasks, Recreational Activities, and Household Chores as compared to previous level of function.    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, work, and leisure activities  Clinical Decision Making (Complexity): Low complexity    PLAN OF CARE  Treatment Interventions:  Modalities:  US and Paraffin  Therapeutic Exercise:  AROM, Tendon Gliding, Isometrics, and Stabilization  Neuromuscular re-education:  Nerve Gliding, Coordination/Dexterity, Proprioceptive Training,   Manual Techniques:  Friction massage and Myofascial release  Orthotic Fabrication:  Static  Self Care:  Ergonomic Considerations and Work Tasks    Long Term Goals   OT Goal 1  Goal Description: Pt will ID 2 effective techniques for mgmt of pain and instability, for improved QOL and hand function in I/ADLs and work  Rationale: In order to maximize safety and independence with ADL/IADLs  Target Date: 05/21/25      Frequency of Treatment: 1x/week  Duration of Treatment: 8 weeks     Recommended Referrals to Other Professionals:  no  Education Assessment: Learner/Method: Patient  Education Comments: no barriers to learning     Risks and benefits of evaluation/treatment have been explained.   Patient/Family/caregiver agrees with Plan of Care.     Evaluation Time:    OT Eval, Low Complexity Minutes (23219): 25       Signing Clinician: JOVAN Frances

## 2025-04-02 ENCOUNTER — THERAPY VISIT (OUTPATIENT)
Dept: OCCUPATIONAL THERAPY | Facility: REHABILITATION | Age: 25
End: 2025-04-02
Attending: FAMILY MEDICINE
Payer: COMMERCIAL

## 2025-04-02 DIAGNOSIS — M79.641 BILATERAL HAND PAIN: Primary | ICD-10-CM

## 2025-04-02 DIAGNOSIS — M79.642 BILATERAL HAND PAIN: Primary | ICD-10-CM

## 2025-04-02 PROCEDURE — 97535 SELF CARE MNGMENT TRAINING: CPT | Mod: GO

## 2025-04-03 PROBLEM — M79.642 BILATERAL HAND PAIN: Status: ACTIVE | Noted: 2025-04-03

## 2025-04-03 PROBLEM — M79.641 BILATERAL HAND PAIN: Status: ACTIVE | Noted: 2025-04-03

## 2025-04-09 ENCOUNTER — THERAPY VISIT (OUTPATIENT)
Dept: OCCUPATIONAL THERAPY | Facility: REHABILITATION | Age: 25
End: 2025-04-09
Attending: FAMILY MEDICINE
Payer: COMMERCIAL

## 2025-04-09 DIAGNOSIS — M79.641 BILATERAL HAND PAIN: Primary | ICD-10-CM

## 2025-04-09 DIAGNOSIS — M79.642 BILATERAL HAND PAIN: Primary | ICD-10-CM

## 2025-04-10 DIAGNOSIS — Q79.60 EHLERS-DANLOS SYNDROME: ICD-10-CM

## 2025-04-14 RX ORDER — PROPRANOLOL HYDROCHLORIDE 10 MG/1
10-40 TABLET ORAL
Qty: 100 TABLET | Refills: 0 | Status: SHIPPED | OUTPATIENT
Start: 2025-04-14

## 2025-04-23 ENCOUNTER — THERAPY VISIT (OUTPATIENT)
Dept: OCCUPATIONAL THERAPY | Facility: REHABILITATION | Age: 25
End: 2025-04-23
Payer: COMMERCIAL

## 2025-04-23 DIAGNOSIS — M79.641 BILATERAL HAND PAIN: Primary | ICD-10-CM

## 2025-04-23 DIAGNOSIS — M79.642 BILATERAL HAND PAIN: Primary | ICD-10-CM
